# Patient Record
Sex: FEMALE | Race: WHITE | NOT HISPANIC OR LATINO | Employment: OTHER | URBAN - METROPOLITAN AREA
[De-identification: names, ages, dates, MRNs, and addresses within clinical notes are randomized per-mention and may not be internally consistent; named-entity substitution may affect disease eponyms.]

---

## 2023-08-07 LAB
ALANINE AMINOTRANSFERASE (SGPT) (U/L) IN SER/PLAS: 16 U/L (ref 7–45)
ALBUMIN (G/DL) IN SER/PLAS: 4.3 G/DL (ref 3.4–5)
ALBUMIN (MG/L) IN URINE: 17.4 MG/L
ALBUMIN/CREATININE (UG/MG) IN URINE: 13.8 UG/MG CRT (ref 0–30)
ALKALINE PHOSPHATASE (U/L) IN SER/PLAS: 64 U/L (ref 33–136)
ANION GAP IN SER/PLAS: 13 MMOL/L (ref 10–20)
ASPARTATE AMINOTRANSFERASE (SGOT) (U/L) IN SER/PLAS: 20 U/L (ref 9–39)
BASOPHILS (10*3/UL) IN BLOOD BY AUTOMATED COUNT: 0.06 X10E9/L (ref 0–0.1)
BASOPHILS/100 LEUKOCYTES IN BLOOD BY AUTOMATED COUNT: 1 % (ref 0–2)
BILIRUBIN TOTAL (MG/DL) IN SER/PLAS: 0.5 MG/DL (ref 0–1.2)
CALCIUM (MG/DL) IN SER/PLAS: 10.1 MG/DL (ref 8.6–10.3)
CARBON DIOXIDE, TOTAL (MMOL/L) IN SER/PLAS: 29 MMOL/L (ref 21–32)
CHLORIDE (MMOL/L) IN SER/PLAS: 99 MMOL/L (ref 98–107)
CHOLESTEROL (MG/DL) IN SER/PLAS: 249 MG/DL (ref 0–199)
CHOLESTEROL IN HDL (MG/DL) IN SER/PLAS: 58.4 MG/DL
CHOLESTEROL/HDL RATIO: 4.3
CREATININE (MG/DL) IN SER/PLAS: 0.7 MG/DL (ref 0.5–1.05)
CREATININE (MG/DL) IN URINE: 126 MG/DL (ref 20–320)
EOSINOPHILS (10*3/UL) IN BLOOD BY AUTOMATED COUNT: 0.18 X10E9/L (ref 0–0.4)
EOSINOPHILS/100 LEUKOCYTES IN BLOOD BY AUTOMATED COUNT: 3 % (ref 0–6)
ERYTHROCYTE DISTRIBUTION WIDTH (RATIO) BY AUTOMATED COUNT: 13.2 % (ref 11.5–14.5)
ERYTHROCYTE MEAN CORPUSCULAR HEMOGLOBIN CONCENTRATION (G/DL) BY AUTOMATED: 33.3 G/DL (ref 32–36)
ERYTHROCYTE MEAN CORPUSCULAR VOLUME (FL) BY AUTOMATED COUNT: 94 FL (ref 80–100)
ERYTHROCYTES (10*6/UL) IN BLOOD BY AUTOMATED COUNT: 4.24 X10E12/L (ref 4–5.2)
ESTIMATED AVERAGE GLUCOSE FOR HBA1C: 128 MG/DL
GFR FEMALE: >90 ML/MIN/1.73M2
GLUCOSE (MG/DL) IN SER/PLAS: 130 MG/DL (ref 74–99)
HEMATOCRIT (%) IN BLOOD BY AUTOMATED COUNT: 40 % (ref 36–46)
HEMOGLOBIN (G/DL) IN BLOOD: 13.3 G/DL (ref 12–16)
HEMOGLOBIN A1C/HEMOGLOBIN TOTAL IN BLOOD: 6.1 %
HEPATITIS C VIRUS AB PRESENCE IN SERUM: NONREACTIVE
IMMATURE GRANULOCYTES/100 LEUKOCYTES IN BLOOD BY AUTOMATED COUNT: 0.3 % (ref 0–0.9)
LDL: 167 MG/DL (ref 0–99)
LEUKOCYTES (10*3/UL) IN BLOOD BY AUTOMATED COUNT: 6.1 X10E9/L (ref 4.4–11.3)
LYMPHOCYTES (10*3/UL) IN BLOOD BY AUTOMATED COUNT: 1.62 X10E9/L (ref 0.8–3)
LYMPHOCYTES/100 LEUKOCYTES IN BLOOD BY AUTOMATED COUNT: 26.7 % (ref 13–44)
MONOCYTES (10*3/UL) IN BLOOD BY AUTOMATED COUNT: 0.44 X10E9/L (ref 0.05–0.8)
MONOCYTES/100 LEUKOCYTES IN BLOOD BY AUTOMATED COUNT: 7.2 % (ref 2–10)
NEUTROPHILS (10*3/UL) IN BLOOD BY AUTOMATED COUNT: 3.75 X10E9/L (ref 1.6–5.5)
NEUTROPHILS/100 LEUKOCYTES IN BLOOD BY AUTOMATED COUNT: 61.8 % (ref 40–80)
NRBC (PER 100 WBCS) BY AUTOMATED COUNT: 0 /100 WBC (ref 0–0)
PLATELETS (10*3/UL) IN BLOOD AUTOMATED COUNT: 185 X10E9/L (ref 150–450)
POTASSIUM (MMOL/L) IN SER/PLAS: 4.5 MMOL/L (ref 3.5–5.3)
PROTEIN TOTAL: 6.7 G/DL (ref 6.4–8.2)
SODIUM (MMOL/L) IN SER/PLAS: 136 MMOL/L (ref 136–145)
TRIGLYCERIDE (MG/DL) IN SER/PLAS: 118 MG/DL (ref 0–149)
UREA NITROGEN (MG/DL) IN SER/PLAS: 15 MG/DL (ref 6–23)
VLDL: 24 MG/DL (ref 0–40)

## 2023-11-06 ENCOUNTER — ANCILLARY PROCEDURE (OUTPATIENT)
Dept: RADIOLOGY | Facility: CLINIC | Age: 73
End: 2023-11-06
Payer: MEDICARE

## 2023-11-06 DIAGNOSIS — Z12.31 ENCOUNTER FOR SCREENING MAMMOGRAM FOR MALIGNANT NEOPLASM OF BREAST: ICD-10-CM

## 2023-11-06 PROCEDURE — 77067 SCR MAMMO BI INCL CAD: CPT | Mod: BILATERAL PROCEDURE | Performed by: RADIOLOGY

## 2023-11-06 PROCEDURE — 77067 SCR MAMMO BI INCL CAD: CPT

## 2023-11-06 PROCEDURE — 77063 BREAST TOMOSYNTHESIS BI: CPT | Mod: BILATERAL PROCEDURE | Performed by: RADIOLOGY

## 2023-11-10 DIAGNOSIS — G47.33 OSA (OBSTRUCTIVE SLEEP APNEA): Primary | ICD-10-CM

## 2023-11-10 DIAGNOSIS — G47.33 OBSTRUCTIVE SLEEP APNEA (ADULT) (PEDIATRIC): ICD-10-CM

## 2023-11-10 NOTE — PROGRESS NOTES
Patient called in requested PAP supply order, last seen Anais Lentz 6/9/23, order placed to [facility].

## 2023-11-28 ENCOUNTER — PATIENT OUTREACH (OUTPATIENT)
Dept: CARE COORDINATION | Facility: CLINIC | Age: 73
End: 2023-11-28
Payer: MEDICARE

## 2023-11-28 NOTE — PROGRESS NOTES
Left message on patient's phone with these details.    Miguel Angel, My name is Yun. I am a Patient Navigator with [facility].    I am reaching out to assist you in scheduling your next Annual Wellness Visit with your Primary Care Provider. If you would like assistance with scheduling your next Wellness Visit you can call my direct number at [phone] or the main office of your Primary Care Provider.

## 2024-02-06 ENCOUNTER — OFFICE VISIT (OUTPATIENT)
Dept: PODIATRY | Facility: CLINIC | Age: 74
End: 2024-02-06
Payer: MEDICARE

## 2024-02-06 DIAGNOSIS — M72.2 PLANTAR FASCIITIS: ICD-10-CM

## 2024-02-06 DIAGNOSIS — L82.0 SEBORRHEIC KERATOSES, INFLAMED: Primary | ICD-10-CM

## 2024-02-06 DIAGNOSIS — L29.9 ITCHING: ICD-10-CM

## 2024-02-06 PROCEDURE — 1159F MED LIST DOCD IN RCRD: CPT | Performed by: PODIATRIST

## 2024-02-06 PROCEDURE — 1160F RVW MEDS BY RX/DR IN RCRD: CPT | Performed by: PODIATRIST

## 2024-02-06 PROCEDURE — 3008F BODY MASS INDEX DOCD: CPT | Performed by: PODIATRIST

## 2024-02-06 PROCEDURE — 1126F AMNT PAIN NOTED NONE PRSNT: CPT | Performed by: PODIATRIST

## 2024-02-06 PROCEDURE — 99213 OFFICE O/P EST LOW 20 MIN: CPT | Performed by: PODIATRIST

## 2024-02-06 PROCEDURE — 17110 DESTRUCTION B9 LES UP TO 14: CPT | Performed by: PODIATRIST

## 2024-02-06 RX ORDER — NITROGLYCERIN 0.4 MG/1
0.4 TABLET SUBLINGUAL EVERY 5 MIN PRN
COMMUNITY
Start: 2019-08-12

## 2024-02-06 RX ORDER — LISINOPRIL 10 MG/1
10 TABLET ORAL DAILY
COMMUNITY
End: 2024-05-07

## 2024-02-06 NOTE — PROGRESS NOTES
History Of Present Illness  Melisa Oseguera is a 73 y.o. female presenting with chief complaint of: Painful lesion underneath her left fifth metatarsal head.  She also has some aching in her feet resembling plantar fasciitis.     Past Medical History  She has no past medical history on file.    Surgical History  She has a past surgical history that includes Tonsillectomy (03/21/2016); Other surgical history (12/16/2021); and Colonoscopy (11/21/2022).     Social History  She has no history on file for tobacco use, alcohol use, and drug use.    Family History  Family History   Problem Relation Name Age of Onset    Breast cancer Mother's Sister      Breast cancer Father's Sister          Allergies  Patient has no known allergies.    Medications  Current Outpatient Medications   Medication Sig Dispense Refill    lisinopril 10 mg tablet Take 1 tablet (10 mg) by mouth once daily.      nitroglycerin (Nitrostat) 0.4 mg SL tablet Place 1 tablet (0.4 mg) under the tongue every 5 minutes if needed.       No current facility-administered medications for this visit.       Review of Systems    REVIEW OF SYSTEMS  GENERAL:  Negative for malaise, significant weight loss, fever  CARDIOVASCULAR: leg swelling   MUSCULOSKELETAL:  Negative for joint pain or swelling, back pain, and muscle pain.  SKIN:  Negative for lesions, rash, and itching  PSYCH:  Negative for sleep disturbance, mood disorder and recent psychosocial stressors  NEURO: Negative, denies any burning, tingling or numbness     Objective:   Vasc: DP and PT pulses are palpable bilateral.  CFT is less than 3 seconds bilateral.  Skin temperature is warm to cool proximal to distal bilateral.      Neuro:  Light touch is intact to the foot bilateral.  Protective sensation is intact to the foot when tested with the 5.07 SWM bilateral.  There is no clonus noted.  The hallux is downgoing bilateral.      Derm: Nails are normal. Skin is supple with normal texture and turgor noted.   Webspaces are clean, dry and intact bilateral.  Subfifth metatarsal head left foot: Patient has lesion (s) on plantar feet that are punctate with nucleated deep painful core    Ortho: Muscle strength is 5/5 for all pedal groups tested.  Ankle joint, subtalar joint, 1st MPJ and lesser MPJ ROM is full and without pain or crepitus.  Patient has a taut plantar fascia more pronounced on the left than the right.  She has some arch fatigue.  Assessment/Plan     Diagnoses and all orders for this visit:  Seborrheic keratoses, inflamed  Itching  Plantar fasciitis      Lesion(s) are excised with scalpel blade.  Cryo treatment is performed for 30 seconds x 3     Patient can try to file lesion down if it reoccurs.  As for her plantar fasciitis,  Patient can try ice, stretching exercises  And shoe gear modification.

## 2024-03-26 ENCOUNTER — APPOINTMENT (OUTPATIENT)
Dept: PRIMARY CARE | Facility: CLINIC | Age: 74
End: 2024-03-26
Payer: MEDICARE

## 2024-04-03 PROBLEM — M54.50 LOWER BACK PAIN: Status: ACTIVE | Noted: 2024-04-03

## 2024-04-03 PROBLEM — H91.90 HEARING LOSS: Status: ACTIVE | Noted: 2024-04-03

## 2024-04-03 PROBLEM — R73.9 HYPERGLYCEMIA: Status: ACTIVE | Noted: 2024-04-03

## 2024-04-03 PROBLEM — M65.28 CALCIFIC ACHILLES TENDINITIS OF RIGHT LOWER EXTREMITY: Status: ACTIVE | Noted: 2024-04-03

## 2024-04-03 PROBLEM — G89.29 TOE PAIN, CHRONIC: Status: ACTIVE | Noted: 2024-04-03

## 2024-04-03 PROBLEM — R51.9 OCCIPITAL HEADACHE: Status: ACTIVE | Noted: 2024-04-03

## 2024-04-03 PROBLEM — M79.674 CHRONIC PAIN OF TOE OF RIGHT FOOT: Status: ACTIVE | Noted: 2024-04-03

## 2024-04-03 PROBLEM — J34.2 DEVIATED NASAL SEPTUM: Status: ACTIVE | Noted: 2024-04-03

## 2024-04-03 PROBLEM — I10 HYPERTENSION: Status: ACTIVE | Noted: 2024-04-03

## 2024-04-03 PROBLEM — L60.0 INGROWN TOENAIL OF RIGHT FOOT: Status: ACTIVE | Noted: 2024-04-03

## 2024-04-03 PROBLEM — R94.31 ABNORMAL ECG: Status: ACTIVE | Noted: 2024-04-03

## 2024-04-03 PROBLEM — I10 BENIGN ESSENTIAL HYPERTENSION: Status: ACTIVE | Noted: 2024-04-03

## 2024-04-03 PROBLEM — R94.39 ABNORMAL STRESS TEST: Status: ACTIVE | Noted: 2024-04-03

## 2024-04-03 PROBLEM — M79.672 PAIN OF LEFT HEEL: Status: ACTIVE | Noted: 2024-04-03

## 2024-04-03 PROBLEM — R03.0 ELEVATED BLOOD PRESSURE READING WITHOUT DIAGNOSIS OF HYPERTENSION: Status: ACTIVE | Noted: 2024-04-03

## 2024-04-03 PROBLEM — L29.9 PRURITUS: Status: ACTIVE | Noted: 2024-04-03

## 2024-04-03 PROBLEM — R20.2 TINGLING: Status: ACTIVE | Noted: 2024-04-03

## 2024-04-03 PROBLEM — R07.9 CHEST PAIN: Status: ACTIVE | Noted: 2024-04-03

## 2024-04-03 PROBLEM — I25.84 CORONARY ARTERY CALCIFICATION: Status: ACTIVE | Noted: 2024-04-03

## 2024-04-03 PROBLEM — E78.00 HYPERCHOLESTEROLEMIA: Status: ACTIVE | Noted: 2024-04-03

## 2024-04-03 PROBLEM — M76.61 CALCIFIC ACHILLES TENDINITIS OF RIGHT LOWER EXTREMITY: Status: ACTIVE | Noted: 2024-04-03

## 2024-04-03 PROBLEM — M76.62 ACHILLES TENDINITIS OF LEFT LOWER EXTREMITY: Status: ACTIVE | Noted: 2024-04-03

## 2024-04-03 PROBLEM — E78.5 HYPERLIPIDEMIA: Status: ACTIVE | Noted: 2024-04-03

## 2024-04-03 PROBLEM — L82.0 SEBORRHEIC KERATOSIS, INFLAMED: Status: ACTIVE | Noted: 2024-04-03

## 2024-04-03 PROBLEM — J30.9 ALLERGIC RHINITIS: Status: ACTIVE | Noted: 2024-04-03

## 2024-04-03 PROBLEM — M65.311 TRIGGER FINGER OF RIGHT THUMB: Status: ACTIVE | Noted: 2024-04-03

## 2024-04-03 PROBLEM — S81.011A LACERATION OF KNEE, RIGHT: Status: ACTIVE | Noted: 2024-04-03

## 2024-04-03 PROBLEM — M19.90 ARTHRITIS: Status: ACTIVE | Noted: 2024-04-03

## 2024-04-03 PROBLEM — M79.644 PAIN OF RIGHT THUMB: Status: ACTIVE | Noted: 2024-04-03

## 2024-04-03 PROBLEM — R06.83 SNORING: Status: ACTIVE | Noted: 2024-04-03

## 2024-04-03 PROBLEM — E11.9 CONTROLLED DIABETES MELLITUS (MULTI): Status: ACTIVE | Noted: 2024-04-03

## 2024-04-03 PROBLEM — D12.6 TUBULAR ADENOMA OF COLON: Status: ACTIVE | Noted: 2024-04-03

## 2024-04-03 PROBLEM — G89.29 CHRONIC PAIN OF TOE OF RIGHT FOOT: Status: ACTIVE | Noted: 2024-04-03

## 2024-04-03 PROBLEM — B35.1 NAIL FUNGUS: Status: ACTIVE | Noted: 2024-04-03

## 2024-04-03 PROBLEM — M79.676 TOE PAIN, CHRONIC: Status: ACTIVE | Noted: 2024-04-03

## 2024-04-03 PROBLEM — Z78.0 MENOPAUSE: Status: ACTIVE | Noted: 2024-04-03

## 2024-04-03 PROBLEM — I25.10 CORONARY ARTERY CALCIFICATION: Status: ACTIVE | Noted: 2024-04-03

## 2024-04-03 PROBLEM — R06.02 SOBOE (SHORTNESS OF BREATH ON EXERTION): Status: ACTIVE | Noted: 2024-04-03

## 2024-04-18 PROBLEM — M72.2 PLANTAR FASCIITIS: Status: ACTIVE | Noted: 2024-04-18

## 2024-04-18 PROBLEM — Z71.89 CARDIAC RISK COUNSELING: Status: ACTIVE | Noted: 2024-04-18

## 2024-04-18 PROBLEM — Z00.00 HEALTHCARE MAINTENANCE: Status: ACTIVE | Noted: 2024-04-18

## 2024-04-23 ENCOUNTER — APPOINTMENT (OUTPATIENT)
Dept: PRIMARY CARE | Facility: CLINIC | Age: 74
End: 2024-04-23
Payer: MEDICARE

## 2024-05-06 DIAGNOSIS — I10 ESSENTIAL (PRIMARY) HYPERTENSION: ICD-10-CM

## 2024-05-07 RX ORDER — LISINOPRIL 10 MG/1
10 TABLET ORAL DAILY
Qty: 90 TABLET | Refills: 0 | Status: SHIPPED | OUTPATIENT
Start: 2024-05-07

## 2024-08-01 PROBLEM — I10 BENIGN ESSENTIAL HYPERTENSION: Status: RESOLVED | Noted: 2024-04-03 | Resolved: 2024-08-01

## 2024-08-01 PROBLEM — Z71.89 CARDIAC RISK COUNSELING: Status: RESOLVED | Noted: 2024-04-18 | Resolved: 2024-08-01

## 2024-08-01 PROBLEM — R03.0 ELEVATED BLOOD PRESSURE READING WITHOUT DIAGNOSIS OF HYPERTENSION: Status: RESOLVED | Noted: 2024-04-03 | Resolved: 2024-08-01

## 2024-08-01 PROBLEM — E78.00 HYPERCHOLESTEROLEMIA: Status: RESOLVED | Noted: 2024-04-03 | Resolved: 2024-08-01

## 2024-08-05 DIAGNOSIS — I10 ESSENTIAL (PRIMARY) HYPERTENSION: ICD-10-CM

## 2024-08-06 ENCOUNTER — APPOINTMENT (OUTPATIENT)
Dept: PRIMARY CARE | Facility: CLINIC | Age: 74
End: 2024-08-06
Payer: MEDICARE

## 2024-08-06 VITALS
TEMPERATURE: 98.1 F | HEIGHT: 64 IN | WEIGHT: 166 LBS | BODY MASS INDEX: 28.34 KG/M2 | SYSTOLIC BLOOD PRESSURE: 136 MMHG | DIASTOLIC BLOOD PRESSURE: 83 MMHG | HEART RATE: 68 BPM

## 2024-08-06 DIAGNOSIS — H91.90 HEARING LOSS, UNSPECIFIED HEARING LOSS TYPE, UNSPECIFIED LATERALITY: ICD-10-CM

## 2024-08-06 DIAGNOSIS — Z00.00 HEALTHCARE MAINTENANCE: Primary | ICD-10-CM

## 2024-08-06 DIAGNOSIS — Z78.0 ASYMPTOMATIC MENOPAUSE: ICD-10-CM

## 2024-08-06 DIAGNOSIS — I10 PRIMARY HYPERTENSION: ICD-10-CM

## 2024-08-06 DIAGNOSIS — Z00.00 ROUTINE GENERAL MEDICAL EXAMINATION AT HEALTH CARE FACILITY: ICD-10-CM

## 2024-08-06 DIAGNOSIS — G47.33 OSA (OBSTRUCTIVE SLEEP APNEA): ICD-10-CM

## 2024-08-06 DIAGNOSIS — I83.90 VARICOSE VEINS OF LOWER EXTREMITY, UNSPECIFIED LATERALITY, UNSPECIFIED WHETHER COMPLICATED: ICD-10-CM

## 2024-08-06 DIAGNOSIS — E66.3 OVERWEIGHT WITH BODY MASS INDEX (BMI) OF 28 TO 28.9 IN ADULT: ICD-10-CM

## 2024-08-06 DIAGNOSIS — E78.2 MIXED HYPERLIPIDEMIA: ICD-10-CM

## 2024-08-06 DIAGNOSIS — E11.9 CONTROLLED TYPE 2 DIABETES MELLITUS WITHOUT COMPLICATION, WITHOUT LONG-TERM CURRENT USE OF INSULIN (MULTI): ICD-10-CM

## 2024-08-06 DIAGNOSIS — L98.9 SKIN LESION OF BACK: ICD-10-CM

## 2024-08-06 LAB — POC HEMOGLOBIN A1C: 6.2 % (ref 4.2–6.5)

## 2024-08-06 PROCEDURE — 3008F BODY MASS INDEX DOCD: CPT | Performed by: FAMILY MEDICINE

## 2024-08-06 PROCEDURE — 99214 OFFICE O/P EST MOD 30 MIN: CPT | Performed by: FAMILY MEDICINE

## 2024-08-06 PROCEDURE — 1170F FXNL STATUS ASSESSED: CPT | Performed by: FAMILY MEDICINE

## 2024-08-06 PROCEDURE — 1159F MED LIST DOCD IN RCRD: CPT | Performed by: FAMILY MEDICINE

## 2024-08-06 PROCEDURE — 83036 HEMOGLOBIN GLYCOSYLATED A1C: CPT | Performed by: FAMILY MEDICINE

## 2024-08-06 PROCEDURE — 3075F SYST BP GE 130 - 139MM HG: CPT | Performed by: FAMILY MEDICINE

## 2024-08-06 PROCEDURE — 1123F ACP DISCUSS/DSCN MKR DOCD: CPT | Performed by: FAMILY MEDICINE

## 2024-08-06 PROCEDURE — G0439 PPPS, SUBSEQ VISIT: HCPCS | Performed by: FAMILY MEDICINE

## 2024-08-06 PROCEDURE — 4010F ACE/ARB THERAPY RXD/TAKEN: CPT | Performed by: FAMILY MEDICINE

## 2024-08-06 PROCEDURE — 1160F RVW MEDS BY RX/DR IN RCRD: CPT | Performed by: FAMILY MEDICINE

## 2024-08-06 PROCEDURE — 3079F DIAST BP 80-89 MM HG: CPT | Performed by: FAMILY MEDICINE

## 2024-08-06 ASSESSMENT — ACTIVITIES OF DAILY LIVING (ADL)
MANAGING_FINANCES: INDEPENDENT
TAKING_MEDICATION: INDEPENDENT
GROCERY_SHOPPING: INDEPENDENT
BATHING: INDEPENDENT
DOING_HOUSEWORK: INDEPENDENT
DRESSING: INDEPENDENT

## 2024-08-06 ASSESSMENT — PATIENT HEALTH QUESTIONNAIRE - PHQ9
1. LITTLE INTEREST OR PLEASURE IN DOING THINGS: NOT AT ALL
2. FEELING DOWN, DEPRESSED OR HOPELESS: NOT AT ALL
SUM OF ALL RESPONSES TO PHQ9 QUESTIONS 1 AND 2: 0

## 2024-08-06 NOTE — PROGRESS NOTES
"Subjective   Reason for Visit: Melisa Oseguera is an 74 y.o. female here for a Medicare Wellness visit.     Past Medical, Surgical, and Family History reviewed and updated in chart.     Patient presents today for an annual wellness visit and follow-up on her chronic medical problems.  She has some spots on her back that she would like to have looked at.  She also has some varicose veins she has some concerns with.  She states they do not really cause pain.  She continues to eat healthy and exercise.  She reports that mood is good.  She denies any chest pain or shortness of breath or abdominal pain.  She denies any other concerns.    HPI    Patient Care Team:  Parisa Mcnamara MD as PCP - General  Parisa Mcnamara MD as PCP - [facility] Attributed Provider     Review of Systems    Objective   Vitals:  /83 (BP Location: Left arm, Patient Position: Sitting)   Pulse 68   Temp 36.7 °C (98.1 °F)   Ht 1.626 m (5' 4\")   Wt 75.3 kg (166 lb)   BMI 28.49 kg/m²       Physical Exam  HENT:      Head: Normocephalic.      Right Ear: Tympanic membrane normal.      Left Ear: Tympanic membrane normal.      Mouth/Throat:      Mouth: Mucous membranes are moist.      Pharynx: Oropharynx is clear.   Eyes:      Extraocular Movements: Extraocular movements intact.      Conjunctiva/sclera: Conjunctivae normal.      Pupils: Pupils are equal, round, and reactive to light.   Cardiovascular:      Rate and Rhythm: Normal rate and regular rhythm.      Pulses: Normal pulses.   Pulmonary:      Effort: Pulmonary effort is normal.      Breath sounds: Normal breath sounds.   Chest:   Breasts:     Right: Normal. No mass, nipple discharge, skin change or tenderness.      Left: Normal. No mass, nipple discharge, skin change or tenderness.   Abdominal:      General: There is no distension.      Palpations: Abdomen is soft.      Tenderness: There is no abdominal tenderness.   Musculoskeletal:         General: Normal range of motion.      Cervical " back: Neck supple.   Lymphadenopathy:      Cervical: No cervical adenopathy.   Skin:     General: Skin is warm and dry.      Comments: Patient with the 2 lesions on the back on the right mid back there is about a 1 cm scaly erythematous patch.  On the left side of the back there is a raised soft lesion with some hyperpigmentation around it.   Neurological:      General: No focal deficit present.      Mental Status: She is alert.         Assessment/Plan   Problem List Items Addressed This Visit             ICD-10-CM    MILTON (obstructive sleep apnea) G47.33     Continue with CPAP and continue follow-up with sleep medicine.         Controlled diabetes mellitus (Multi) E11.9     Diabetes has been diet controlled.  Continue with diabetic diet and exercise.  See ophthalmology.  Check feet.  Follow-up in 6 months to reevaluate.         Relevant Orders    POCT glycosylated hemoglobin (Hb A1C) manually resulted (Completed)    Hearing loss H91.90     Patient does report hearing loss.  Will be referred to audiology for further evaluation.         Relevant Orders    Referral to Audiology    Hyperlipidemia E78.5     Cholesterol is elevated.  ASCVD risk score is 34%.  Recommend starting a statin which patient declines.         Hypertension I10     Blood pressure controlled.  Continue with current dose of lisinopril.         Relevant Orders    Albumin-Creatinine Ratio, Urine Random    Lipid Panel    Comprehensive Metabolic Panel    CBC and Auto Differential    Healthcare maintenance - Primary Z00.00     Continue with healthy diet and exercise.  Screening blood work ordered.  Mammogram up-to-date.  Bone density ordered.  Colon cancer screening up-to-date.  Recommend Shingrix vaccine and RSV vaccine.         Overweight with body mass index (BMI) of 28 to 28.9 in adult E66.3, Z68.28    Varicose veins of lower extremity I83.90     Reviewed wearing compression stockings and elevating legs.  Patient declines seeing vein specialist at  this point.         Skin lesion of back L98.9     2 lesions on the back.  Discussed with patient this could be precancerous.  Recommend she see dermatology for further evaluation.          Other Visit Diagnoses         Codes    Asymptomatic menopause     Z78.0    Relevant Orders    XR DEXA bone density    Routine general medical examination at health care facility     Z00.00

## 2024-08-07 NOTE — ASSESSMENT & PLAN NOTE
2 lesions on the back.  Discussed with patient this could be precancerous.  Recommend she see dermatology for further evaluation.

## 2024-08-07 NOTE — ASSESSMENT & PLAN NOTE
Continue with healthy diet and exercise.  Screening blood work ordered.  Mammogram up-to-date.  Bone density ordered.  Colon cancer screening up-to-date.  Recommend Shingrix vaccine and RSV vaccine.

## 2024-08-07 NOTE — ASSESSMENT & PLAN NOTE
Cholesterol is elevated.  ASCVD risk score is 34%.  Recommend starting a statin which patient declines.

## 2024-08-07 NOTE — ASSESSMENT & PLAN NOTE
Reviewed wearing compression stockings and elevating legs.  Patient declines seeing vein specialist at this point.

## 2024-08-07 NOTE — ASSESSMENT & PLAN NOTE
Diabetes has been diet controlled.  Continue with diabetic diet and exercise.  See ophthalmology.  Check feet.  Follow-up in 6 months to reevaluate.

## 2024-08-08 RX ORDER — LISINOPRIL 10 MG/1
10 TABLET ORAL DAILY
Qty: 90 TABLET | Refills: 1 | Status: SHIPPED | OUTPATIENT
Start: 2024-08-08

## 2024-09-21 ENCOUNTER — TELEMEDICINE (OUTPATIENT)
Dept: PRIMARY CARE | Facility: CLINIC | Age: 74
End: 2024-09-21
Payer: MEDICARE

## 2024-09-21 DIAGNOSIS — U07.1 COVID: Primary | ICD-10-CM

## 2024-09-21 PROCEDURE — 4010F ACE/ARB THERAPY RXD/TAKEN: CPT | Performed by: FAMILY MEDICINE

## 2024-09-21 PROCEDURE — 99214 OFFICE O/P EST MOD 30 MIN: CPT | Performed by: FAMILY MEDICINE

## 2024-09-21 PROCEDURE — 1159F MED LIST DOCD IN RCRD: CPT | Performed by: FAMILY MEDICINE

## 2024-09-21 PROCEDURE — 1160F RVW MEDS BY RX/DR IN RCRD: CPT | Performed by: FAMILY MEDICINE

## 2024-09-21 PROCEDURE — 1123F ACP DISCUSS/DSCN MKR DOCD: CPT | Performed by: FAMILY MEDICINE

## 2024-09-21 NOTE — PROGRESS NOTES
I performed this visit using realtime telehealth tools, including an audio/video OR telephone connection between the patient listed who was located in the [facility] and myself, Karen Richter (Board certified in the [facility]).  At the start of the visit, I introduced myself as Dr. De Jesus and verified the patients name, , and current physical location.    If they were currently outside of the state of [place], the visit was ended and the patient was referred to alternative means for evaluation and treatment.   The patient was made aware of the limitations of the telehealth visit.  They will not be physically examined and all issues may not be appropriate for a telehealth visit.  If necessary, an in person referral will be made.      DISCLAIMER:   In preparing for this visit and writing this note, I reviewed previous electronic medical records (labs, imaging and medical charts) of the patient available in the physician portal. Significant findings which helped in decision making are recorded in this encounter charting.    All allergies were reviewed with the patient and all medications reconciled with the patient.     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 Yes  Discussed stopping statin x 7 days --N/A  Kidney function reviewed --normal 1 year ago-- no history of kidney issues  Telemedicine limits the ability to do a complete and accurate physical exam.    Discussed current isolation guidelines per the CDC  Vaccine booster recommended in the next ~2 months if not already received this season    At the completion of the visit, possible diagnoses and plan was discussed with the patient as well as recommended treatments, medications prescribed, and when to follow up for in person evaluation. All questions were answered and the patient verbalized understanding.

## 2024-09-21 NOTE — PATIENT INSTRUCTIONS
COVID +  Paxlovid discussed--how to take and how it works and adverse effects--paxlovid prescribed? Yes  Discussed stopping statin x 7 days --N/A  Kidney function reviewed --normal 1 year ago-- no history of kidney issues  Telemedicine limits the ability to do a complete and accurate physical exam.   Discussed current isolation guidelines per the CDC  Vaccine booster recommended in the next ~2 months if not already received this season    Please send me a [employer] message if you have any questions or concerns.  FOR NON URGENT questions only.  Allow up to 72 hours for response.    If you have prescription issues or other questions you can email   Shoaib Young  Digital Health Coordinator, at   [email or web address]     Rest and drink plenty of fluids    Tylenol and or motrin as needed for pain and fever (unless you have been told not to take these because of your personal medical history)    Discussed options and precautions (complaint specific and may include)  Viral versus bacterial infection; use of medications; possible side effects; appropriate over-the-counter medications; possible complications and /or when to follow-up.    Follow-up in 1 to 2 days if not improving.  Follow-up immediately if symptoms worsen.    All red flags requiring in person care were discussed.  All patient's questions were answered.    To connect with a new PCP please visit [email or web address] or call [phone]     If experiencing any severe or worsening symptoms including but not limited to lethargy / chest pain / weakness / dizziness / difficulty breathing please call 911 or go to the emergency department for immediate care!    Limitations to telemedicine include inability to do a complete and accurate physical exam.  Any concerns regarding this were conveyed with the patient and in person follow-up recommended if patient nature of illness does not progress as anticipated during this  visit.    CDC updated Respiratory Infection guidelines:   When you have a respiratory virus, stay home and away from others (including people  you live with who are not sick) Symptoms can include fever, chills, fatigue, cough,  runny nose, and headache (and others).    You may return to work when the following 3 conditions are met:    1) No fever without the use of a fever reducer for 24 hours  2 symptoms are overall improving AND  3) symptoms are mild     When you go back to your normal activities, take added precaution over the next 5 days, such as taking additional steps for  air, hygiene, masks, physical distancing, and/or testing when you will be around other people indoors.    Keep in mind that you may still be able to spread the virus that made you sick, even if you are feeling better. You are likely to be less contagious at this time, depending on factors like how long you were sick or how sick you were.    If you develop a fever or you start to feel worse after you have gone back to normal activities, stay home and away from others again until, for at least 24 hours, both are true: your symptoms are improving overall, and you have not had a fever (and are not using fever-reducing medication). Then take added precaution for the next 5 days.    If you never had symptoms but tested positive for a respiratory virus?, you may be contagious. For the next 5 days: take added precaution, such as taking additional steps for  air, hygiene, masks, physical distancing, and/or testing when you will be around other people indoors. This is especially important to protect people with factors that increase their risk of severe illness from respiratory viruses.  Avoid immunocompromised, elderly, pregnant women, infants etc    Have a low threshold for in person evaluation if your symptoms worsen.      Over-the-counter cold and cough medications    Take Mucinex for cough, drink plenty of fluids with this  medication and will help break up congestion making it easier to cough up    For cough can use honey (children ages 1 and up) in hot tea or hot water. I recommend putting this in an insulated cup and carrying it around throughout the day to sip on.  Have it at your bedside at night in case you wake up coughing.  You can also use honey cough drops (adults and older children).    Recommend nasal saline for use in children and adults.  Neti Davis can also be helpful.  (Never used tap water and a Neti pot.  Use distilled water.)    If you have plugged up congested ears or the feeling of fluid in your ears, you can use an over-the-counter nasal steroid spray like fluticasone (brand name Flonase) use 2 sprays into each nostril once or twice a day for 7 days.  This will help open up the eustachian tubes and allow the fluid to drain out of your ears.    Sleeping with your head/chest elevated can help with sinus drainage.    Adults only-can use nasal decongestant (like Afrin) at bedtime to open nasal passages so you can breathe through your nose while you sleep; avoid using for longer than 3 days as this medication can become addicting.  Do not use if you have high blood pressure or high heart rate.    For severe pain or fever in adult-Tylenol (2 extra strength) or ibuprofen (3-4 tabs equals 600 to 800 mg) alternating as needed for pain.  Tylenol doses should be 6 to 8 hours apart and ibuprofen doses should be 6 to 8 hours apart.      Common cold medications for adults explained:    **Cold medications for children are not recommended-only Tylenol, Motrin, honey (only for children older than 1 year), cool-mist humidifier, and nasal saline spray are recommended for children.    Mucinex-(generic name guaifenesin)-is an expectorant.  This will thin out all the thick mucus.  Must drink plenty of liquids for this medicine to work.    Dextromethorphan (brand name Delsym or DM)-this medicine is a cough suppressant--caution/avoid use  if taking SSRI medication because of risk of Serotonin Syndrome    Honey in hot water or tea-this is a natural cough suppressant    Decongestant nasal spray- (eg: Afrin) use for temporary relief of nasal congestion-best when used at bedtime to open up nasal passages so that you are not forced to mouth breathe overnight.    Sudafed (generic name pseudoephedrine-this must be bought from the pharmacist) DO NOT use this medicine if you have high blood pressure as it can raise your blood pressure higher.  Do not use if you have any irregular heart rate.  This medicine can help clear congestion in your sinuses.         I will STOP taking the medications listed below when I get home from the hospital:  None

## 2024-10-31 NOTE — PROGRESS NOTES
Patient: Melisa Oseguera    [id number]  : 1950 -- AGE 74 y.o.    Provider: VENUS Xie     Location [facility]   Service Date: 2024            [facility]  Followup Visit Note      HISTORY OF PRESENT ILLNESS     HISTORY OF PRESENT ILLNESS   Melisa Oseguera is a 74 y.o. female with h/o Hypertension, MILTON, Diabetes, and CAD, HLD, former smoker   who presents to a [facility] for followup.     Current History    24: Here for annual MILTON follow up. Doing well and using every night with positive benefit.   Mask: MASK TYPE: DREAMWEAR MEDIUM NASAL PILLOWS MASK  Issues with therapy: ISSUES WITH THERAPY: dry mouth in morning  ;   Benefits with PAP: PERCEIVED BENEFITS OF PAP: refreshing sleep reduced daytime sleepiness decreased or no snoring better sleep quality  Needs annual supply order placed.     last seen Anais Lentz 23        Media Information        ESS: 6      REVIEW OF SYSTEMS     REVIEW OF SYSTEMS  Review of Systems  A 13 point review of systems was performed and was unremarkable except as per HPI.     ALLERGIES AND MEDICATIONS     ALLERGIES  No Known Allergies    MEDICATIONS: She has a current medication list which includes the following prescription(s): lisinopril - TAKE 1 TABLET DAILY.    PAST MEDICAL HISTORY : She  has no past medical history on file.    PAST SURGICAL HISTORY: She  has a past surgical history that includes Tonsillectomy (2016); Other surgical history (2021); and Colonoscopy (2022).     FAMILY HISTORY: No changes since previous visit. Otherwise non-contributory as charted.     SOCIAL HISTORY  She  reports that she quit smoking about 46 years ago. Her smoking use included cigarettes. She has never used smokeless tobacco. She reports current alcohol use. She reports that she does not use drugs.       PHYSICAL EXAM     VITAL SIGNS: /77   Pulse 70   Resp 18   Ht  "1.626 m (5' 4\")   Wt 74.5 kg (164 lb 4.8 oz)   BMI 28.20 kg/m²      PREVIOUS WEIGHTS:  Wt Readings from Last 3 Encounters:   11/07/24 74.5 kg (164 lb 4.8 oz)   08/06/24 75.3 kg (166 lb)   06/09/23 74.8 kg (165 lb)     Constitutional: Alert and oriented, cooperative, no obvious distress   HEENT: Non icteric or anemic, EOM WNL bilaterally   Neck: Supple, no JVD, no goiter, no adenopathy, no rigidity      RESULTS/DATA     Bicarbonate (mmol/L)   Date Value   08/07/2023 29   10/21/2021 29   01/24/2020 31         ASSESSMENT/PLAN     Ms. Oseguera is a 74 y.o. female and she returns in followup to the [facility] for MILTON.    Problem List, Orders, Assessment, Recommendations:    #MILTON, mild  SS: 3% AHI: 12.6, 4% AHI: 4.9, SpO2 gallito: 86%, %sats< 88%- < than 1 minute  - Retrieved and personally reviewed recent PAP adherence download data today. See HPI.  - excellent compliance to PAP therapy, residual AHI at goal, and good control of MILTON symptoms  - continue current setting 4-12 CWP  - renew PAP supply orders, order placed to [facility]  - diet, exercise, and weight loss were emphasized today in clinic, as were non-supine sleep, avoiding alcohol in the late evening, and driving or operating heavy machinery when sleepy. Patient verbalized understanding.     #HTN  BP Readings from Last 1 Encounters:   11/07/24 124/77     - doing well, asymptomatic, denies any headache, blurry vision, chest pain, palpitation, dizziness, lightheadedness, or syncopal episodes  - discussed at length the impact of untreated MILTON and BP control  - supportive management: low salt DASH diet (less than 2000 mg sodium intake daily), moderate intensity aerobic exercise at least 30 minutes 5 days per week, reduce stress, quit smoking, limit alcohol, lose weight, and monitor BP once daily  - continue current management and follow-up with PCP     #Overweight  BMI Readings from Last 1 Encounters:   11/07/24 28.20 kg/m²     - " Encouraged healthy weight loss via diet and exercise  - Weight loss can help in the long term treatment of MILTON.  - Defer management to PCP     Disposition    Return to clinic in 12 months    I personally spent 30 minutes today (exclusive of procedures) providing care for this patient, including preparation, face to face time, EMR documentation and other services such as review of medical records, diagnostic results, patient education, counseling, and coordination of care.

## 2024-11-07 ENCOUNTER — APPOINTMENT (OUTPATIENT)
Facility: CLINIC | Age: 74
End: 2024-11-07
Payer: MEDICARE

## 2024-11-07 VITALS
DIASTOLIC BLOOD PRESSURE: 77 MMHG | HEART RATE: 70 BPM | HEIGHT: 64 IN | WEIGHT: 164.3 LBS | BODY MASS INDEX: 28.05 KG/M2 | RESPIRATION RATE: 18 BRPM | SYSTOLIC BLOOD PRESSURE: 124 MMHG

## 2024-11-07 DIAGNOSIS — I10 PRIMARY HYPERTENSION: ICD-10-CM

## 2024-11-07 DIAGNOSIS — G47.33 OSA (OBSTRUCTIVE SLEEP APNEA): Primary | ICD-10-CM

## 2024-11-07 DIAGNOSIS — E66.3 OVERWEIGHT (BMI 25.0-29.9): ICD-10-CM

## 2024-11-07 PROCEDURE — 1159F MED LIST DOCD IN RCRD: CPT | Performed by: NURSE PRACTITIONER

## 2024-11-07 PROCEDURE — 3008F BODY MASS INDEX DOCD: CPT | Performed by: NURSE PRACTITIONER

## 2024-11-07 PROCEDURE — 1123F ACP DISCUSS/DSCN MKR DOCD: CPT | Performed by: NURSE PRACTITIONER

## 2024-11-07 PROCEDURE — 1036F TOBACCO NON-USER: CPT | Performed by: NURSE PRACTITIONER

## 2024-11-07 PROCEDURE — 4010F ACE/ARB THERAPY RXD/TAKEN: CPT | Performed by: NURSE PRACTITIONER

## 2024-11-07 PROCEDURE — 3078F DIAST BP <80 MM HG: CPT | Performed by: NURSE PRACTITIONER

## 2024-11-07 PROCEDURE — 3074F SYST BP LT 130 MM HG: CPT | Performed by: NURSE PRACTITIONER

## 2024-11-07 PROCEDURE — 99214 OFFICE O/P EST MOD 30 MIN: CPT | Performed by: NURSE PRACTITIONER

## 2024-11-07 PROCEDURE — 1160F RVW MEDS BY RX/DR IN RCRD: CPT | Performed by: NURSE PRACTITIONER

## 2024-11-07 PROCEDURE — G2211 COMPLEX E/M VISIT ADD ON: HCPCS | Performed by: NURSE PRACTITIONER

## 2024-11-07 ASSESSMENT — SLEEP AND FATIGUE QUESTIONNAIRES
HOW LIKELY ARE YOU TO NOD OFF OR FALL ASLEEP WHILE SITTING AND READING: SLIGHT CHANCE OF DOZING
HOW LIKELY ARE YOU TO NOD OFF OR FALL ASLEEP WHILE SITTING QUIETLY AFTER LUNCH WITHOUT ALCOHOL: WOULD NEVER DOZE
ESS-CHAD TOTAL SCORE: 6
HOW LIKELY ARE YOU TO NOD OFF OR FALL ASLEEP WHILE LYING DOWN TO REST IN THE AFTERNOON WHEN CIRCUMSTANCES PERMIT: MODERATE CHANCE OF DOZING
SITING INACTIVE IN A PUBLIC PLACE LIKE A CLASS ROOM OR A MOVIE THEATER: WOULD NEVER DOZE
HOW LIKELY ARE YOU TO NOD OFF OR FALL ASLEEP WHILE WATCHING TV: MODERATE CHANCE OF DOZING
HOW LIKELY ARE YOU TO NOD OFF OR FALL ASLEEP IN A CAR, WHILE STOPPED FOR A FEW MINUTES IN TRAFFIC: SLIGHT CHANCE OF DOZING
HOW LIKELY ARE YOU TO NOD OFF OR FALL ASLEEP WHILE SITTING AND TALKING TO SOMEONE: WOULD NEVER DOZE
HOW LIKELY ARE YOU TO NOD OFF OR FALL ASLEEP WHEN YOU ARE A PASSENGER IN A CAR FOR AN HOUR WITHOUT A BREAK: WOULD NEVER DOZE

## 2024-11-07 ASSESSMENT — PATIENT HEALTH QUESTIONNAIRE - PHQ9
2. FEELING DOWN, DEPRESSED OR HOPELESS: NOT AT ALL
1. LITTLE INTEREST OR PLEASURE IN DOING THINGS: NOT AT ALL
SUM OF ALL RESPONSES TO PHQ9 QUESTIONS 1 AND 2: 0

## 2024-11-07 ASSESSMENT — COLUMBIA-SUICIDE SEVERITY RATING SCALE - C-SSRS
1. IN THE PAST MONTH, HAVE YOU WISHED YOU WERE DEAD OR WISHED YOU COULD GO TO SLEEP AND NOT WAKE UP?: NO
6. HAVE YOU EVER DONE ANYTHING, STARTED TO DO ANYTHING, OR PREPARED TO DO ANYTHING TO END YOUR LIFE?: NO
2. HAVE YOU ACTUALLY HAD ANY THOUGHTS OF KILLING YOURSELF?: NO

## 2024-11-07 ASSESSMENT — ENCOUNTER SYMPTOMS
LOSS OF SENSATION IN FEET: 0
DEPRESSION: 0
OCCASIONAL FEELINGS OF UNSTEADINESS: 0

## 2024-11-07 NOTE — PATIENT INSTRUCTIONS
[facility] Sleep Medicine   [facility]  [address] [address]  [phone]       NAME: Melisa Oseguera   DATE: 11/07/24    Your Sleep Provider Today: VENUS Xie  Your Primary Care Physician: Parisa Mcnamara MD       DIAGNOSIS:   1. MILTON (obstructive sleep apnea)            Thank you for coming to the Sleep Medicine Clinic today! Your sleep medicine provider today was: VENUS Xie Below is a summary of your treatment plan, other important information, and our contact numbers:      TREATMENT PLAN     - Follow-up in 12 months.  - If not already done, sign up for 'My Chart' and send prescription requests or messages through this    Annual Reminders About Your Sleep Apnea    Your sleep apnea is well controlled based on reviewing your PAP Data Report. A supply renewal prescription has been sent to your DME company.     General Reminders:  Continue current machine settings. Continue using machine every night. Need at least 4 hours daily usage.   Remember to clean your mask, tubings, and water chamber regularly as instructed.  Know the replacement schedule of your supplies/ accessories and contact your DME (durable medical equipment) provider if you are due for them.  Avoid driving or operating heavy machinery when drowsy. A person driving while sleepy is 5 times more likely to have an accident. If you feel sleepy, pull over and take a short power nap (sleep for less than 30 minutes). Otherwise, ask somebody to drive you.    Follow[facility]up sooner through [facility] or calling our office if you have any of the following symptoms:  Snoring or stopping breathing while using the machine  Recurrence of fatigue, sleepiness, insomnia, and other symptoms you had prior using machine  Persistent or worsening fatigue or sleepiness despite regular use of machine  Issues tolerating the machine like bloating sensation, air hunger, too much hot air, too much  pressure, taking off mask without recall in the middle of sleep, etc.     Other conservative measures to improve sleep apnea:  Losing weight can lead to some improvement of MILTON which means you will need lower pressures in machine to control your MILTON. In some patients, they don't need to use the machine after bariatric surgery. Hence, consider medical and/or surgical weight loss especially if your BMI is more than 35.  Avoiding alcohol, sedative-hypnotics, and sedating medications is imperative as these substances can worsen snoring and sleep apnea  If you have nasal congestion or seasonal allergies, improving your breathing through the nose is critical for treating sleep apnea, tolerating CPAP, and improving your sleep; hence, using intranasal steroid spray like Flonase might help. Make sure you know the proper way to use it.  Stay off your back when sleeping.    Common issues with PAP machine:  Mask gets dislodged when turning to the side: Consider getting a CPAP pillow or switching to a mask with hose on top.     Dry mouth:  Your machine has built-in humidifier that heats up the air to prevent dry mouth. It can be adjusted to your comfort. You can try that first and increase setting one level one night at a time to check which setting is comfortable and effective in lessening dry mouth. If dry mouth persists despite humidity setting adjustment, may apply OTC Biotene gel over the gums at bedtime.  If Biotene gel is not effective, consider trying XEROSTOM gel from [employer].  Also, eliminate or reduce dose of meds that can cause dry mouth if possible. Lastly, may try getting a separate room humidifier machine.    Airleaks: Please call DME as they may need to adjust your mask or refit you with a different kind of mask. In addition, you can ask DME for tips on getting a good mask seal and mask fit.     Difficulty tolerating the mask: Contact your DME to try a different kind of mask and/or call office to get a  "referral to Sleep Psychologist for CPAP desensitization. CPAP desensitization technique is a set of strategies that helps patient cope with claustrophobia and anxiety related to wearing mask. Alternatively, we can do a daytime mini-sleep study called PAP-nap trial wherein you will try on different kinds of mask and the sleep technician will try different pressure settings on CPAP and BPAP machines to see which specific pressure is tolerable and comfortable for you.     Water droplets or moisture within the hose and/or mask: This is called rain-out and it is caused by condensation of too much heated humidity on the cooler walls of the hose. If you have rain-out, turn down humidity settings or get a heated hose. If you already have a heated hose, turn up the \"tube temperature\" of the heated hose. Alternatively, if you don't want to get a heated hose or warmer air, may wrap the CPAP hose with stockings to keep it somewhat warm. Also, you need to place the machine on the floor and lower the hose so that water won't travel upward towards your mask.       Maintaining your CPAP/BPAP device:    The humidification chamber (aka water tank or water chamber) needs to be filled with distilled water to prevent buildup of white deposits in the future. If you cannot find distilled water, you can use tap water but expect to have white deposits buildup seen after prolonged use with tap water. If you start seeing white deposits on the water chamber, you can clean it by filling it with equal parts of distilled white vinegar and water. Let the vinegar-water mixture sit for 2 hours, and then rinse it with running tap water. Clean with soap and water then let it dry.     You should try to keep your machine clean in order to work well. Here are some tips to clean PAP supplies / accessories:    Clean the humidification chamber (aka water tank) as well as your mask and tubing at least once a week with soap and water.   Alternatively, you can " fill a sink or basin with warm water and add a little mild detergent, like Ivory dish soap. Gently wipe your supplies with the soapy water to free all the oils and dirt that may have collected. Once that's done, rinse these items with clean water until the soap is gone and let them air dry. You can hang your tubing over the curtain zack in your bathroom so that it dries.  The mask insert (part of the mask that has contact with your skin) needs to be cleaned with soap and water daily. Another option is to wipe them down with CPAP wipes or baby wipes.    You should replace your mask and tubing frequently in order to prevent bacteria buildup, machine damage, and mask seal issues. The older the mask and hose, the high likelihood that there is bacteria buildup in it especially if they are not cleaned regularly. Dirty filters damage machines because build-up of dust and contaminants can cause machine to over-heat, and in time, damage the motor of machine. Cushions lose their seal over time as most masks are made of plastic and silicone while headgear is made of neoprene. These materials will break down with age and frequent use. Here is the recommended replacement schedule for PAP supplies / accessories:    Twice a month- disposable filters and cushions for nasal mask or nasal pillows.  Once a month- cushion for full face mask  Every 3 months- mask with headgear and PAP tubing (standard or heated hose)  Every 6 months- reusable filter, water chamber, and chin strap     Other useful information:    Some people do not put water in the tank while other people prefers to put water in the tank to prevent mouth dryness. Try to experiment to determine which is more comfortable for you.   In general, new machines have 2 years warranty on parts while health insurance allows you to have a new machine once every 5 years.      IMPORTANT INFORMATION     Call 911 for medical emergencies.  Our offices are generally open from  "Monday-Friday, 9 am - 5 pm.  If you need to get in touch with me, you may either call me/my team (number is below) or you can use [employer].  If a referral for a test, for CPAP, or for another specialist was made, and you have not heard about scheduling this within a week, please call scheduling at [phone] ([phone]).  If you are unable to make your appointment for clinic or an overnight study, kindly call the office at least 48 hours in advance to cancel and reschedule.  If you are on CPAP, please bring your device's card or the device to each clinic appointment.   There are no supporting services by either the sleep doctors or their staff on weekends and Holidays, or after 5 PM on weekdays.     PRESCRIPTIONS     We require 7 days advanced notice for prescription refills. If we do not receive the request in this time, we cannot guarantee that your medication will be refilled in time.      IMPORTANT PHONE NUMBERS     [facility]: [phone]  [employer] ([email or web address]): [phone]  [employer] ([email or web address]): [phone]  [facility] (DME): [phone]    CONTACTING YOUR SLEEP MEDICINE PROVIDER AND SLEEP TEAM      For issues with your machine or mask interface, please call your DME provider first. [email or web address] stands for durable medical company. [employer] is the company who provides you the machine and/or PAP supplies / accessories.   To schedule, cancel, or reschedule SLEEP STUDY APPOINTMENTS, please call the Main Phone Line at [phone] - option 3.   To schedule, cancel, or reschedule CLINIC APPOINTMENTS, you can do it in \"MyChart\", call [phone] for [facility] office , [phone] for [facility] office to speak with my on site staff, or call the Main Phone Line at [phone] - option 2  For CLINICAL QUESTIONS or MEDICATION REFILLS, please call direct line for Adult Sleep Nurses at [phone].   Lastly, you can also send a message directly to your provider through \"My Chart\", " which is the email service through your  Records Account: [email or web address]     Adult Sleep Nurses (Aimee Hubbard, RN and Jacqueline Moreira, RN):  For clinical questions and refilling prescriptions: [phone]  Email sleep diaries and other documents at: [email or web address]    Office locations for Shu Driscoll NP:    [facility]   [address]   [address]  [place], [place] [address] [phone]    [address]  [address]  [place], [place] [address] [phone]    [address]  [address]  [place], [place] [address] [phone]    OUR SLEEP TESTING LOCATIONS     Our team will contact you to schedule your sleep study, however, you can contact us as follow:  Main Phone Line (scheduling only): [phone]), option 3    Sleep Testing Locations:   [place] (18 years and older): [address] [place] [address], 2nd floor   [place] (18 years and older): [address]; 4th floor  After hours line: [phone]  [facility] (18 years and older) at [place]: [id number] [place] [address]  After hours line: [phone]   [facility] (Main campus: All ages): [facility], 6th floor. After hours line: [phone]   [place] (5 years and older; younger considered on case-by-case basis): [address] [place] [address]; [employer] [facility] [address], [address]. Scheduling  After hours line: [phone]       Here at [facility], we wish you a restful sleep!    Your sleep medicine provider for this visit was: Shu Driscoll, APRN-CNP

## 2024-12-27 ENCOUNTER — OFFICE VISIT (OUTPATIENT)
Dept: URGENT CARE | Age: 74
End: 2024-12-27
Payer: MEDICARE

## 2024-12-27 VITALS
SYSTOLIC BLOOD PRESSURE: 136 MMHG | TEMPERATURE: 97.9 F | HEIGHT: 64 IN | RESPIRATION RATE: 16 BRPM | HEART RATE: 80 BPM | DIASTOLIC BLOOD PRESSURE: 70 MMHG | OXYGEN SATURATION: 98 % | WEIGHT: 150 LBS | BODY MASS INDEX: 25.61 KG/M2

## 2024-12-27 DIAGNOSIS — R05.1 ACUTE COUGH: Primary | ICD-10-CM

## 2024-12-27 PROCEDURE — 3008F BODY MASS INDEX DOCD: CPT | Performed by: FAMILY MEDICINE

## 2024-12-27 PROCEDURE — 1159F MED LIST DOCD IN RCRD: CPT | Performed by: FAMILY MEDICINE

## 2024-12-27 PROCEDURE — 3078F DIAST BP <80 MM HG: CPT | Performed by: FAMILY MEDICINE

## 2024-12-27 PROCEDURE — 3075F SYST BP GE 130 - 139MM HG: CPT | Performed by: FAMILY MEDICINE

## 2024-12-27 PROCEDURE — 99203 OFFICE O/P NEW LOW 30 MIN: CPT | Performed by: FAMILY MEDICINE

## 2024-12-27 PROCEDURE — 1036F TOBACCO NON-USER: CPT | Performed by: FAMILY MEDICINE

## 2024-12-27 PROCEDURE — 4010F ACE/ARB THERAPY RXD/TAKEN: CPT | Performed by: FAMILY MEDICINE

## 2024-12-27 PROCEDURE — 1123F ACP DISCUSS/DSCN MKR DOCD: CPT | Performed by: FAMILY MEDICINE

## 2024-12-27 PROCEDURE — 1160F RVW MEDS BY RX/DR IN RCRD: CPT | Performed by: FAMILY MEDICINE

## 2024-12-27 RX ORDER — ALBUTEROL SULFATE 90 UG/1
2 INHALANT RESPIRATORY (INHALATION) EVERY 6 HOURS PRN
Qty: 1 G | Refills: 0 | Status: SHIPPED | OUTPATIENT
Start: 2024-12-27

## 2024-12-27 RX ORDER — BENZONATATE 200 MG/1
200 CAPSULE ORAL 3 TIMES DAILY PRN
Qty: 30 CAPSULE | Refills: 0 | Status: SHIPPED | OUTPATIENT
Start: 2024-12-27 | End: 2025-01-06

## 2024-12-27 ASSESSMENT — ENCOUNTER SYMPTOMS
COUGH: 1
SINUS PAIN: 0
CHILLS: 0
SORE THROAT: 0
EYE PAIN: 0
CHEST TIGHTNESS: 0
EYE REDNESS: 0
WHEEZING: 1
SHORTNESS OF BREATH: 0
FEVER: 0
EYE DISCHARGE: 0

## 2024-12-27 NOTE — PROGRESS NOTES
"Subjective   Patient ID: Melisa Oseguera is a 74 y.o. female. They present today with a chief complaint of Cough (X 1 week, getting worse. Now getting some wheezing in lungs).    History of Present Illness    History provided by:  Patient   used: No    Cough  This is a new problem. The current episode started in the past 7 days (12/21/24). The problem has been gradually worsening. The problem occurs hourly. The cough is Non-productive. Associated symptoms include wheezing. Pertinent negatives include no chest pain, chills, ear pain, eye redness, fever, sore throat or shortness of breath. She has tried OTC cough suppressant for the symptoms. The treatment provided mild relief.       Past Medical History  Allergies as of 12/27/2024    (No Known Allergies)       (Not in a hospital admission)       History reviewed. No pertinent past medical history.    Past Surgical History:   Procedure Laterality Date    COLONOSCOPY  11/21/2022    Colonoscopy (Fiberoptic)    OTHER SURGICAL HISTORY  12/16/2021    [place] tooth extraction    TONSILLECTOMY  03/21/2016    Tonsillectomy        reports that she quit smoking about 47 years ago. Her smoking use included cigarettes. She has never used smokeless tobacco. She reports current alcohol use. She reports that she does not use drugs.    Review of Systems  Review of Systems   Constitutional:  Negative for chills and fever.   HENT:  Negative for ear pain, sinus pain and sore throat.    Eyes:  Negative for pain, discharge and redness.   Respiratory:  Positive for cough and wheezing. Negative for chest tightness and shortness of breath.    Cardiovascular:  Negative for chest pain.                                  Objective    Vitals:    12/27/24 1403   BP: 136/70   BP Location: Left arm   Patient Position: Sitting   BP Cuff Size: Adult   Pulse: 80   Resp: 16   Temp: 36.6 °C (97.9 °F)   TempSrc: Oral   SpO2: 98%   Weight: 68 kg (150 lb)   Height: 1.626 m (5' 4\")     No " LMP recorded. Patient is postmenopausal.    Physical Exam  Vitals reviewed.   Constitutional:       General: She is not in acute distress.     Appearance: She is not ill-appearing.   HENT:      Right Ear: Tympanic membrane and ear canal normal.      Left Ear: Tympanic membrane and ear canal normal.      Nose: Nose normal.      Mouth/Throat:      Mouth: Mucous membranes are moist.      Pharynx: No posterior oropharyngeal erythema.   Eyes:      Extraocular Movements: Extraocular movements intact.      Conjunctiva/sclera: Conjunctivae normal.      Pupils: Pupils are equal, round, and reactive to light.   Cardiovascular:      Rate and Rhythm: Normal rate and regular rhythm.      Heart sounds: No murmur heard.     No friction rub.   Pulmonary:      Effort: Pulmonary effort is normal. No respiratory distress.      Breath sounds: No wheezing, rhonchi or rales.   Lymphadenopathy:      Cervical: No cervical adenopathy.   Neurological:      Mental Status: She is alert.         Procedures    Point of Care Test & Imaging Results from this visit  No results found for this visit on 12/27/24.   No results found.    Diagnostic study results (if any) were reviewed by Tyrell Lentz DO.    Assessment/Plan   Allergies, medications, history, and pertinent labs/EKGs/Imaging reviewed by Tyrell Lentz DO.     Orders and Diagnoses  There are no diagnoses linked to this encounter.    Medical Admin Record      Patient disposition: Home    Electronically signed by Tyrell Lentz DO  2:19 PM

## 2025-01-24 ENCOUNTER — LAB (OUTPATIENT)
Dept: LAB | Facility: LAB | Age: 75
End: 2025-01-24
Payer: MEDICARE

## 2025-01-24 ENCOUNTER — HOSPITAL ENCOUNTER (OUTPATIENT)
Dept: RADIOLOGY | Facility: CLINIC | Age: 75
Discharge: HOME | End: 2025-01-24
Payer: MEDICARE

## 2025-01-24 DIAGNOSIS — R73.9 HYPERGLYCEMIA: ICD-10-CM

## 2025-01-24 DIAGNOSIS — I10 PRIMARY HYPERTENSION: ICD-10-CM

## 2025-01-24 DIAGNOSIS — Z78.0 ASYMPTOMATIC MENOPAUSE: ICD-10-CM

## 2025-01-24 DIAGNOSIS — R73.9 HYPERGLYCEMIA: Primary | ICD-10-CM

## 2025-01-24 LAB
ALBUMIN SERPL BCP-MCNC: 4.6 G/DL (ref 3.4–5)
ALP SERPL-CCNC: 82 U/L (ref 33–136)
ALT SERPL W P-5'-P-CCNC: 23 U/L (ref 7–45)
ANION GAP SERPL CALC-SCNC: 13 MMOL/L (ref 10–20)
AST SERPL W P-5'-P-CCNC: 16 U/L (ref 9–39)
BASOPHILS # BLD AUTO: 0.06 X10*3/UL (ref 0–0.1)
BASOPHILS NFR BLD AUTO: 1.2 %
BILIRUB SERPL-MCNC: 0.7 MG/DL (ref 0–1.2)
BUN SERPL-MCNC: 14 MG/DL (ref 6–23)
CALCIUM SERPL-MCNC: 10.5 MG/DL (ref 8.6–10.6)
CHLORIDE SERPL-SCNC: 97 MMOL/L (ref 98–107)
CHOLEST SERPL-MCNC: 299 MG/DL (ref 0–199)
CHOLESTEROL/HDL RATIO: 4.1
CO2 SERPL-SCNC: 30 MMOL/L (ref 21–32)
CREAT SERPL-MCNC: 0.63 MG/DL (ref 0.5–1.05)
EGFRCR SERPLBLD CKD-EPI 2021: >90 ML/MIN/1.73M*2
EOSINOPHIL # BLD AUTO: 0.19 X10*3/UL (ref 0–0.4)
EOSINOPHIL NFR BLD AUTO: 3.9 %
ERYTHROCYTE [DISTWIDTH] IN BLOOD BY AUTOMATED COUNT: 12.8 % (ref 11.5–14.5)
EST. AVERAGE GLUCOSE BLD GHB EST-MCNC: 137 MG/DL
GLUCOSE SERPL-MCNC: 148 MG/DL (ref 74–99)
HBA1C MFR BLD: 6.4 %
HCT VFR BLD AUTO: 41.6 % (ref 36–46)
HDLC SERPL-MCNC: 72.1 MG/DL
HGB BLD-MCNC: 13.9 G/DL (ref 12–16)
IMM GRANULOCYTES # BLD AUTO: 0.02 X10*3/UL (ref 0–0.5)
IMM GRANULOCYTES NFR BLD AUTO: 0.4 % (ref 0–0.9)
LDLC SERPL CALC-MCNC: 202 MG/DL
LYMPHOCYTES # BLD AUTO: 1.46 X10*3/UL (ref 0.8–3)
LYMPHOCYTES NFR BLD AUTO: 30.2 %
MCH RBC QN AUTO: 31.1 PG (ref 26–34)
MCHC RBC AUTO-ENTMCNC: 33.4 G/DL (ref 32–36)
MCV RBC AUTO: 93 FL (ref 80–100)
MONOCYTES # BLD AUTO: 0.4 X10*3/UL (ref 0.05–0.8)
MONOCYTES NFR BLD AUTO: 8.3 %
NEUTROPHILS # BLD AUTO: 2.71 X10*3/UL (ref 1.6–5.5)
NEUTROPHILS NFR BLD AUTO: 56 %
NON HDL CHOLESTEROL: 227 MG/DL (ref 0–149)
NRBC BLD-RTO: 0 /100 WBCS (ref 0–0)
PLATELET # BLD AUTO: 190 X10*3/UL (ref 150–450)
POTASSIUM SERPL-SCNC: 5 MMOL/L (ref 3.5–5.3)
PROT SERPL-MCNC: 6.8 G/DL (ref 6.4–8.2)
RBC # BLD AUTO: 4.47 X10*6/UL (ref 4–5.2)
SODIUM SERPL-SCNC: 135 MMOL/L (ref 136–145)
TRIGL SERPL-MCNC: 127 MG/DL (ref 0–149)
VLDL: 25 MG/DL (ref 0–40)
WBC # BLD AUTO: 4.8 X10*3/UL (ref 4.4–11.3)

## 2025-01-24 PROCEDURE — 77080 DXA BONE DENSITY AXIAL: CPT | Performed by: RADIOLOGY

## 2025-01-24 PROCEDURE — 80061 LIPID PANEL: CPT

## 2025-01-24 PROCEDURE — 80053 COMPREHEN METABOLIC PANEL: CPT

## 2025-01-24 PROCEDURE — 85025 COMPLETE CBC W/AUTO DIFF WBC: CPT

## 2025-01-24 PROCEDURE — 83036 HEMOGLOBIN GLYCOSYLATED A1C: CPT

## 2025-01-24 PROCEDURE — 77080 DXA BONE DENSITY AXIAL: CPT

## 2025-02-10 ENCOUNTER — APPOINTMENT (OUTPATIENT)
Dept: PRIMARY CARE | Facility: CLINIC | Age: 75
End: 2025-02-10
Payer: MEDICARE

## 2025-02-10 VITALS
HEART RATE: 72 BPM | BODY MASS INDEX: 28.32 KG/M2 | SYSTOLIC BLOOD PRESSURE: 133 MMHG | OXYGEN SATURATION: 99 % | WEIGHT: 165 LBS | DIASTOLIC BLOOD PRESSURE: 83 MMHG | TEMPERATURE: 97.1 F

## 2025-02-10 DIAGNOSIS — E11.9 CONTROLLED TYPE 2 DIABETES MELLITUS WITHOUT COMPLICATION, WITHOUT LONG-TERM CURRENT USE OF INSULIN (MULTI): Primary | ICD-10-CM

## 2025-02-10 DIAGNOSIS — I10 ESSENTIAL (PRIMARY) HYPERTENSION: ICD-10-CM

## 2025-02-10 DIAGNOSIS — E66.3 OVERWEIGHT WITH BODY MASS INDEX (BMI) OF 28 TO 28.9 IN ADULT: ICD-10-CM

## 2025-02-10 DIAGNOSIS — I10 PRIMARY HYPERTENSION: ICD-10-CM

## 2025-02-10 DIAGNOSIS — E78.2 MIXED HYPERLIPIDEMIA: ICD-10-CM

## 2025-02-10 DIAGNOSIS — M85.80 OSTEOPENIA, UNSPECIFIED LOCATION: ICD-10-CM

## 2025-02-10 DIAGNOSIS — Z12.31 SCREENING MAMMOGRAM FOR BREAST CANCER: ICD-10-CM

## 2025-02-10 PROCEDURE — 4010F ACE/ARB THERAPY RXD/TAKEN: CPT | Performed by: FAMILY MEDICINE

## 2025-02-10 PROCEDURE — 3079F DIAST BP 80-89 MM HG: CPT | Performed by: FAMILY MEDICINE

## 2025-02-10 PROCEDURE — 1036F TOBACCO NON-USER: CPT | Performed by: FAMILY MEDICINE

## 2025-02-10 PROCEDURE — 3044F HG A1C LEVEL LT 7.0%: CPT | Performed by: FAMILY MEDICINE

## 2025-02-10 PROCEDURE — 3075F SYST BP GE 130 - 139MM HG: CPT | Performed by: FAMILY MEDICINE

## 2025-02-10 PROCEDURE — 1160F RVW MEDS BY RX/DR IN RCRD: CPT | Performed by: FAMILY MEDICINE

## 2025-02-10 PROCEDURE — 1159F MED LIST DOCD IN RCRD: CPT | Performed by: FAMILY MEDICINE

## 2025-02-10 PROCEDURE — 1123F ACP DISCUSS/DSCN MKR DOCD: CPT | Performed by: FAMILY MEDICINE

## 2025-02-10 PROCEDURE — G2211 COMPLEX E/M VISIT ADD ON: HCPCS | Performed by: FAMILY MEDICINE

## 2025-02-10 PROCEDURE — 99214 OFFICE O/P EST MOD 30 MIN: CPT | Performed by: FAMILY MEDICINE

## 2025-02-10 PROCEDURE — 3050F LDL-C >= 130 MG/DL: CPT | Performed by: FAMILY MEDICINE

## 2025-02-10 RX ORDER — LISINOPRIL 10 MG/1
10 TABLET ORAL DAILY
Qty: 90 TABLET | Refills: 1 | Status: SHIPPED | OUTPATIENT
Start: 2025-02-10

## 2025-02-10 ASSESSMENT — PATIENT HEALTH QUESTIONNAIRE - PHQ9
1. LITTLE INTEREST OR PLEASURE IN DOING THINGS: NOT AT ALL
SUM OF ALL RESPONSES TO PHQ9 QUESTIONS 1 AND 2: 0
2. FEELING DOWN, DEPRESSED OR HOPELESS: NOT AT ALL

## 2025-02-10 NOTE — ASSESSMENT & PLAN NOTE
ASCVD risk score is 35%.  Recommend starting a statin which patient declines.  Will plan to recheck in 6 months.

## 2025-02-10 NOTE — ASSESSMENT & PLAN NOTE
Patient declines medication at this point.  Continue with calcium, vitamin D and weightbearing exercises.  Will check vitamin D level.  Repeat bone density in 2 years.

## 2025-02-10 NOTE — ASSESSMENT & PLAN NOTE
Diabetes is controlled with diet.  Continue with diabetic diet and exercise.  See ophthalmology.  Check feet.  Recheck blood work in 6 months.

## 2025-02-10 NOTE — ASSESSMENT & PLAN NOTE
Blood pressure well-controlled.  Continue with lisinopril.  Check blood work as ordered.  Follow-up in 6 months to reevaluate.

## 2025-02-10 NOTE — PROGRESS NOTES
Subjective   Patient ID: Melisa Oseguera is a 74 y.o. female who presents for Follow-up (No concerns).  Patient presents today for follow-up on her chronic medical problems.  She reports that overall she has been doing well.  She states that she did have upper respiratory illness and used albuterol and that was helpful.  She states that she continues to eat healthy and stay active.  She denies any chest pain or shortness of breath or abdominal pain.  We reviewed that cholesterol remains elevated.  She declines starting a statin.  We did review that her ASCVD risk score is over 20%.  We also reviewed her bone density showed moderate osteopenia with a T-score of -1.8.  She would like to continue with calcium and vitamin D and weightbearing exercises.  She denies any other concerns.  HPI  Social History     Socioeconomic History    Marital status:      Spouse name: Not on file    Number of children: Not on file    Years of education: Not on file    Highest education level: Not on file   Occupational History    Not on file   Tobacco Use    Smoking status: Former     Current packs/day: 0.00     Types: Cigarettes     Quit date:      Years since quittin.1    Smokeless tobacco: Never   Vaping Use    Vaping status: Never Used   Substance and Sexual Activity    Alcohol use: Yes     Comment: glass of wine daily    Drug use: Never    Sexual activity: Not on file   Other Topics Concern    Not on file   Social History Narrative    Not on file     Social Drivers of Health     Financial Resource Strain: Not on file   Food Insecurity: Not on file   Transportation Needs: Not on file   Physical Activity: Not on file   Stress: Not on file   Social Connections: Not on file   Intimate Partner Violence: Not on file   Housing Stability: Not on file     Current Outpatient Medications   Medication Sig Dispense Refill    albuterol 90 mcg/actuation inhaler Inhale 2 puffs every 6 hours if needed for wheezing. 1 g 0     lisinopril 10 mg tablet Take 1 tablet (10 mg) by mouth once daily. 90 tablet 1     No current facility-administered medications for this visit.     Family History   Problem Relation Name Age of Onset    Hypertension Mother      Sleep apnea Mother      Other (cva) Father      Sleep apnea Father      Hypertension Sister      Breast cancer Mother's Sister      Breast cancer Father's Sister       Review of Systems  Immunization History   Administered Date(s) Administered    COVID-19, mRNA, LNP-S, PF, 30 mcg/0.3 mL dose 03/02/2021, 03/24/2021, 09/24/2021    Flu vaccine, quadrivalent, high-dose, preservative free, age 65y+ (FLUZONE) 10/19/2021, 10/29/2022, 09/26/2023    Flu vaccine, trivalent, preservative free, HIGH-DOSE, age 65y+ (Fluzone) 10/19/2021, 11/22/2024    Influenza, Seasonal, Quadrivalent, Adjuvanted 10/26/2020, 11/08/2022    Influenza, Unspecified 12/10/2007, 10/01/2018    Pfizer COVID-19 vaccine, 12 years and older, (30mcg/0.3mL) (Comirnaty) 02/08/2024    Pfizer COVID-19 vaccine, bivalent, age 12 years and older (30 mcg/0.3 mL) 09/09/2022    Pfizer Gray Cap SARS-CoV-2 05/17/2022    Pneumococcal conjugate vaccine, 13-valent (PREVNAR 13) 08/12/2019    Pneumococcal polysaccharide vaccine, 23-valent, age 2 years and older (PNEUMOVAX 23) 01/11/2022    Tdap vaccine, age 7 year and older (BOOSTRIX, ADACEL) 06/12/2020, 04/17/2024    Zoster, live 02/11/2014       Review of Systems negative except as noted in HPI and Chief complaint.     Objective                 /83 (BP Location: Left arm, Patient Position: Sitting)   Pulse 72   Temp 36.2 °C (97.1 °F) (Skin)   Wt 74.8 kg (165 lb)   SpO2 99%   BMI 28.32 kg/m²    Physical Exam  Vitals reviewed.   HENT:      Head: Normocephalic and atraumatic.      Right Ear: Tympanic membrane normal.      Left Ear: Tympanic membrane normal.      Nose: Nose normal.      Mouth/Throat:      Pharynx: Oropharynx is clear.   Eyes:      Extraocular Movements: Extraocular movements  "intact.      Conjunctiva/sclera: Conjunctivae normal.      Pupils: Pupils are equal, round, and reactive to light.   Cardiovascular:      Rate and Rhythm: Normal rate and regular rhythm.      Pulses: Normal pulses.   Pulmonary:      Effort: Pulmonary effort is normal.      Breath sounds: Normal breath sounds.   Abdominal:      General: There is no distension.      Palpations: Abdomen is soft.      Tenderness: There is no abdominal tenderness.   Musculoskeletal:         General: Normal range of motion.      Cervical back: Normal range of motion and neck supple.      Right lower leg: No edema.      Left lower leg: No edema.   Lymphadenopathy:      Cervical: No cervical adenopathy.   Neurological:      General: No focal deficit present.      Mental Status: She is alert.       No results found for this or any previous visit (from the past 96 hours).  Lab Results   Component Value Date    WBC 4.8 01/24/2025    HGB 13.9 01/24/2025    HCT 41.6 01/24/2025    MCV 93 01/24/2025     01/24/2025     Lab Results   Component Value Date    GLUCOSE 148 (H) 01/24/2025    CALCIUM 10.5 01/24/2025     (L) 01/24/2025    K 5.0 01/24/2025    CO2 30 01/24/2025    CL 97 (L) 01/24/2025    BUN 14 01/24/2025    CREATININE 0.63 01/24/2025     Lab Results   Component Value Date    CHOL 299 (H) 01/24/2025    CHOL 249 (H) 08/07/2023    CHOL 272 (H) 10/21/2021     Lab Results   Component Value Date    HDL 72.1 01/24/2025    HDL 58.4 08/07/2023    HDL 64.0 10/21/2021     Lab Results   Component Value Date    LDLCALC 202 (H) 01/24/2025     Lab Results   Component Value Date    TRIG 127 01/24/2025    TRIG 118 08/07/2023    TRIG 135 10/21/2021     No components found for: \"CHOLHDL\"   No results found for: \"TSH\"   Lab Results   Component Value Date    HGBA1C 6.4 (H) 01/24/2025      No results found for: \"ALBUMINUR\"   Assessment/Plan   Problem List Items Addressed This Visit       Controlled diabetes mellitus (Multi) - Primary     Diabetes is " controlled with diet.  Continue with diabetic diet and exercise.  See ophthalmology.  Check feet.  Recheck blood work in 6 months.         Relevant Orders    Albumin-Creatinine Ratio, Urine Random    Hemoglobin A1C    Lipid Panel    Comprehensive Metabolic Panel    CBC and Auto Differential    Hyperlipidemia     ASCVD risk score is 35%.  Recommend starting a statin which patient declines.  Will plan to recheck in 6 months.         Hypertension     Blood pressure well-controlled.  Continue with lisinopril.  Check blood work as ordered.  Follow-up in 6 months to reevaluate.         Overweight with body mass index (BMI) of 28 to 28.9 in adult    Osteopenia     Patient declines medication at this point.  Continue with calcium, vitamin D and weightbearing exercises.  Will check vitamin D level.  Repeat bone density in 2 years.         Relevant Orders    Vitamin D 25-Hydroxy,Total (for eval of Vitamin D levels)     Other Visit Diagnoses       Screening mammogram for breast cancer        Relevant Orders    BI mammo bilateral screening tomosynthesis    Essential (primary) hypertension        Relevant Medications    lisinopril 10 mg tablet

## 2025-02-28 ENCOUNTER — LAB REQUISITION (OUTPATIENT)
Dept: DERMATOPATHOLOGY | Facility: CLINIC | Age: 75
End: 2025-02-28
Payer: MEDICARE

## 2025-02-28 DIAGNOSIS — D23.5 OTHER BENIGN NEOPLASM OF SKIN OF TRUNK: ICD-10-CM

## 2025-02-28 DIAGNOSIS — C43.59 MALIGNANT MELANOMA OF OTHER PART OF TRUNK: ICD-10-CM

## 2025-02-28 PROCEDURE — 88321 CONSLTJ&REPRT SLD PREP ELSWR: CPT | Performed by: DERMATOLOGY

## 2025-03-03 LAB
PATH REPORT.FINAL DX SPEC: NORMAL
PATH REPORT.GROSS SPEC: NORMAL
PATH REPORT.MICROSCOPIC SPEC OTHER STN: NORMAL
PATH REPORT.RELEVANT HX SPEC: NORMAL
PATH REPORT.TOTAL CANCER: NORMAL
PATHOLOGY SYNOPTIC REPORT: NORMAL

## 2025-03-03 NOTE — PROGRESS NOTES
Assessment and Plan:  Melisa Oseguera is a 74 y.o. female referred by Krista Ricketts with a newly diagnosed melanoma of the right upper back that is at least 0.8 mm thick, cT1b. After discussing the risks and benefits of wide local excision with sentinel lymph node biopsy the patient understands and would like to proceed. We will look for OR time at [place] in the coming weeks. I believe this will close primarily with a 1 cm margin.     Tumor board discussion is pending    I spent 60 minutes in the professional and overall care of this patient.    Tawanda Serrano MD  Assistant Professor of Surgery  Division of Surgical Oncology  [phone]  [email or web address]      History Of Present Illness  Referring provider: Krista Ricketts  Diagnosis: melanoma  Location: right upper back  Thickness: at least 0.8 mm  Margins: Mis at margins  Subtype: ss  High-risk features: ulcerated, 1 mitosis    All other systems have been reviewed and are negative except as noted in the HPI.    I personally reviewed all necessary laboratory results, pathology reports, and radiologic images for this patient.    Past Medical History  She has no past medical history on file.    Surgical History  She has a past surgical history that includes Tonsillectomy (03/21/2016); Other surgical history (12/16/2021); and Colonoscopy (11/21/2022).     Social History  She reports that she quit smoking about 47 years ago. Her smoking use included cigarettes. She has never used smokeless tobacco. She reports current alcohol use. She reports that she does not use drugs.    Family History  Family History   Problem Relation Name Age of Onset    Hypertension Mother      Sleep apnea Mother      Other (cva) Father      Sleep apnea Father      Hypertension Sister      Breast cancer Mother's Sister      Breast cancer Father's Sister          Allergies  Patient has no known allergies.     Last Recorded Vitals  There were no vitals taken for this  visit.    Physical Exam  General: no acute distress, well-nourished  Eyes: intact EOM, no scleral icterus  ENT: hearing intact, no drainage  Respiratory: symmetric chest rise, no cough  Cardiovascular: intact distal pulses, no pitting edema  Abdominal: Soft, nontender  Musculoskeletal: no deformities, intact strength  Integumentary: Healing biopsy site, no lymphadenopathy  Neuro: no focal deficits, sensation intact  Psych: normal mood and affect     Relevant Results  FINAL DIAGNOSIS   7 SLIDES, [facility], #[id number] (BX: 02/05/2025)     A. SKIN, RIGHT SUPERIOR UPPER BACK, SHAVE BIOPSY:  ULCERATED MALIGNANT MELANOMA, BRESLOW THICKNESS 0.8 MM, PRESENT ON THE DEEP AND PERIPHERAL MARGIN AND DERMAL NEVUS, PRESENT ON THE DEEP MARGIN, SEE NOTE.     Note: Microscopic examination reveals a specimen that extends into the dermis. There is an area of ulceration and there is an asymmetric proliferation of nested and single melanocytes throughout all layers of the epidermis. There are nests of benign-appearing melanocytes in the dermis and nests of atypical melanocytes. The atypical melanocytes in the epidermis and dermis have mildly enlarged nuclei with moderate cytoplasm. They stain with antibodies against Melan-A, SOX-10 and PRAME. The benign-appearing melanocytes in the dermis do not stain with antibodies against PRAME. A D2-40 stain was reviewed. On the H&E there is an area suspicious for vascular invasion.      B. SKIN, LEFT MID-UPPER BACK, SHAVE BIOPSY:  HEMANGIOMA, PRESENT ON THE DEEP MARGIN.     ** Electronically signed out by Yanni Burch MD **      Electronically signed by Yanni Burch MD on 3/3/2025 at 1011   Case Summary Report   MELANOMA OF THE SKIN: Biopsy   8th Edition - Protocol posted: 3/23/2022MELANOMA OF THE SKIN: BIOPSY - A  SPECIMEN   Procedure  Biopsy, shave   Specimen Laterality  Right   TUMOR   Tumor Site  Skin of trunk: Right superior upper back          Histologic  Type  Superficial spreading melanoma (low-cumulative sun damage (CSD) melanoma)   Maximum Tumor (Breslow) Thickness (Millimeters)  0.8 mm   Ulceration  Present   Extent of Ulceration (Millimeters)  1 mm   Anatomic (Britton) Level  IV (melanoma invades reticular dermis)   Mitotic Rate  1 mitoses per mm2   Microsatellite(s)  Not identified   Lymphovascular Invasion  Present   Neurotropism  Not identified   Tumor-Infiltrating Lymphocytes  Present, nonbrisk   Tumor Regression  Not identified   MARGINS     Margin Status for Invasive Melanoma  Invasive melanoma present at margin   Margin(s) Involved by Invasive Melanoma  Peripheral   Margin Status for Melanoma in situ  Melanoma in situ present at margin   Margin(s) Involved by Melanoma in Situ  Peripheral     Deep   PATHOLOGIC STAGE CLASSIFICATION (pTNM, AJCC 8th Edition)     pT Category  pT1b

## 2025-03-03 NOTE — H&P (VIEW-ONLY)
Assessment and Plan:  Melisa Osegeura is a 74 y.o. female referred by Krista Ricketts with a newly diagnosed melanoma of the right upper back that is at least 0.8 mm thick, cT1b. After discussing the risks and benefits of wide local excision with sentinel lymph node biopsy the patient understands and would like to proceed. We will look for OR time at [place] in the coming weeks. I believe this will close primarily with a 1 cm margin.     Tumor board discussion is pending    I spent 60 minutes in the professional and overall care of this patient.    Tawanda Serrano MD  Assistant Professor of Surgery  Division of Surgical Oncology  [phone]  [email or web address]      History Of Present Illness  Referring provider: Krista Ricketts  Diagnosis: melanoma  Location: right upper back  Thickness: at least 0.8 mm  Margins: Mis at margins  Subtype: ss  High-risk features: ulcerated, 1 mitosis    All other systems have been reviewed and are negative except as noted in the HPI.    I personally reviewed all necessary laboratory results, pathology reports, and radiologic images for this patient.    Past Medical History  She has no past medical history on file.    Surgical History  She has a past surgical history that includes Tonsillectomy (03/21/2016); Other surgical history (12/16/2021); and Colonoscopy (11/21/2022).     Social History  She reports that she quit smoking about 47 years ago. Her smoking use included cigarettes. She has never used smokeless tobacco. She reports current alcohol use. She reports that she does not use drugs.    Family History  Family History   Problem Relation Name Age of Onset    Hypertension Mother      Sleep apnea Mother      Other (cva) Father      Sleep apnea Father      Hypertension Sister      Breast cancer Mother's Sister      Breast cancer Father's Sister          Allergies  Patient has no known allergies.     Last Recorded Vitals  There were no vitals taken for this  visit.    Physical Exam  General: no acute distress, well-nourished  Eyes: intact EOM, no scleral icterus  ENT: hearing intact, no drainage  Respiratory: symmetric chest rise, no cough  Cardiovascular: intact distal pulses, no pitting edema  Abdominal: Soft, nontender  Musculoskeletal: no deformities, intact strength  Integumentary: Healing biopsy site, no lymphadenopathy  Neuro: no focal deficits, sensation intact  Psych: normal mood and affect     Relevant Results  FINAL DIAGNOSIS   7 SLIDES, [employer], #[id number] (BX: 02/05/2025)     A. SKIN, RIGHT SUPERIOR UPPER BACK, SHAVE BIOPSY:  ULCERATED MALIGNANT MELANOMA, BRESLOW THICKNESS 0.8 MM, PRESENT ON THE DEEP AND PERIPHERAL MARGIN AND DERMAL NEVUS, PRESENT ON THE DEEP MARGIN, SEE NOTE.     Note: Microscopic examination reveals a specimen that extends into the dermis. There is an area of ulceration and there is an asymmetric proliferation of nested and single melanocytes throughout all layers of the epidermis. There are nests of benign-appearing melanocytes in the dermis and nests of atypical melanocytes. The atypical melanocytes in the epidermis and dermis have mildly enlarged nuclei with moderate cytoplasm. They stain with antibodies against Melan-A, SOX-10 and PRAME. The benign-appearing melanocytes in the dermis do not stain with antibodies against PRAME. A D2-40 stain was reviewed. On the H&E there is an area suspicious for vascular invasion.      B. SKIN, LEFT MID-UPPER BACK, SHAVE BIOPSY:  HEMANGIOMA, PRESENT ON THE DEEP MARGIN.     ** Electronically signed out by Yanni Burch MD **      Electronically signed by Yanni Burch MD on 3/3/2025 at 1011   Case Summary Report   MELANOMA OF THE SKIN: Biopsy   8th Edition - Protocol posted: 3/23/2022MELANOMA OF THE SKIN: BIOPSY - A  SPECIMEN   Procedure  Biopsy, shave   Specimen Laterality  Right   TUMOR   Tumor Site  Skin of trunk: Right superior upper back          Histologic  Type  Superficial spreading melanoma (low-cumulative sun damage (CSD) melanoma)   Maximum Tumor (Breslow) Thickness (Millimeters)  0.8 mm   Ulceration  Present   Extent of Ulceration (Millimeters)  1 mm   Anatomic (Britton) Level  IV (melanoma invades reticular dermis)   Mitotic Rate  1 mitoses per mm2   Microsatellite(s)  Not identified   Lymphovascular Invasion  Present   Neurotropism  Not identified   Tumor-Infiltrating Lymphocytes  Present, nonbrisk   Tumor Regression  Not identified   MARGINS     Margin Status for Invasive Melanoma  Invasive melanoma present at margin   Margin(s) Involved by Invasive Melanoma  Peripheral   Margin Status for Melanoma in situ  Melanoma in situ present at margin   Margin(s) Involved by Melanoma in Situ  Peripheral     Deep   PATHOLOGIC STAGE CLASSIFICATION (pTNM, AJCC 8th Edition)     pT Category  pT1b

## 2025-03-04 ENCOUNTER — OFFICE VISIT (OUTPATIENT)
Dept: SURGICAL ONCOLOGY | Facility: CLINIC | Age: 75
End: 2025-03-04
Payer: MEDICARE

## 2025-03-04 VITALS
HEART RATE: 74 BPM | WEIGHT: 163.5 LBS | TEMPERATURE: 98.4 F | BODY MASS INDEX: 28.97 KG/M2 | HEIGHT: 63 IN | RESPIRATION RATE: 16 BRPM | OXYGEN SATURATION: 98 % | SYSTOLIC BLOOD PRESSURE: 151 MMHG | DIASTOLIC BLOOD PRESSURE: 74 MMHG

## 2025-03-04 DIAGNOSIS — C43.59 MALIGNANT MELANOMA OF UPPER BACK (MULTI): Primary | ICD-10-CM

## 2025-03-04 PROCEDURE — 1123F ACP DISCUSS/DSCN MKR DOCD: CPT | Performed by: STUDENT IN AN ORGANIZED HEALTH CARE EDUCATION/TRAINING PROGRAM

## 2025-03-04 PROCEDURE — 1159F MED LIST DOCD IN RCRD: CPT | Performed by: STUDENT IN AN ORGANIZED HEALTH CARE EDUCATION/TRAINING PROGRAM

## 2025-03-04 PROCEDURE — 3050F LDL-C >= 130 MG/DL: CPT | Performed by: STUDENT IN AN ORGANIZED HEALTH CARE EDUCATION/TRAINING PROGRAM

## 2025-03-04 PROCEDURE — 3008F BODY MASS INDEX DOCD: CPT | Performed by: STUDENT IN AN ORGANIZED HEALTH CARE EDUCATION/TRAINING PROGRAM

## 2025-03-04 PROCEDURE — 3077F SYST BP >= 140 MM HG: CPT | Performed by: STUDENT IN AN ORGANIZED HEALTH CARE EDUCATION/TRAINING PROGRAM

## 2025-03-04 PROCEDURE — 99204 OFFICE O/P NEW MOD 45 MIN: CPT | Performed by: STUDENT IN AN ORGANIZED HEALTH CARE EDUCATION/TRAINING PROGRAM

## 2025-03-04 PROCEDURE — 1126F AMNT PAIN NOTED NONE PRSNT: CPT | Performed by: STUDENT IN AN ORGANIZED HEALTH CARE EDUCATION/TRAINING PROGRAM

## 2025-03-04 PROCEDURE — 3044F HG A1C LEVEL LT 7.0%: CPT | Performed by: STUDENT IN AN ORGANIZED HEALTH CARE EDUCATION/TRAINING PROGRAM

## 2025-03-04 PROCEDURE — 4010F ACE/ARB THERAPY RXD/TAKEN: CPT | Performed by: STUDENT IN AN ORGANIZED HEALTH CARE EDUCATION/TRAINING PROGRAM

## 2025-03-04 PROCEDURE — 99214 OFFICE O/P EST MOD 30 MIN: CPT | Mod: 57 | Performed by: STUDENT IN AN ORGANIZED HEALTH CARE EDUCATION/TRAINING PROGRAM

## 2025-03-04 PROCEDURE — 1036F TOBACCO NON-USER: CPT | Performed by: STUDENT IN AN ORGANIZED HEALTH CARE EDUCATION/TRAINING PROGRAM

## 2025-03-04 PROCEDURE — 3078F DIAST BP <80 MM HG: CPT | Performed by: STUDENT IN AN ORGANIZED HEALTH CARE EDUCATION/TRAINING PROGRAM

## 2025-03-04 RX ORDER — CEFAZOLIN SODIUM 2 G/100ML
2 INJECTION, SOLUTION INTRAVENOUS ONCE
OUTPATIENT
Start: 2025-03-04 | End: 2025-03-04

## 2025-03-04 ASSESSMENT — PAIN SCALES - GENERAL: PAINLEVEL_OUTOF10: 0-NO PAIN

## 2025-03-04 NOTE — LETTER
March 4, 2025     Krista Ricketts MD  [address] [address]  [address]  [place] [address]    Patient: Melisa Oseguera   YOB: 1950   Date of Visit: 3/4/2025       Dear Dr. Krista Ricketts MD:    Thank you for referring Melisa Oseguera to me for evaluation. Below are my notes for this consultation.  If you have questions, please do not hesitate to call me. I look forward to following your patient along with you.       Sincerely,     Tawanda Serrano MD      CC: No Recipients  ______________________________________________________________________________________    Assessment and Plan:  Melisa Oseguera is a 74 y.o. female referred by Krista Ricketts with a newly diagnosed melanoma of the right upper back that is at least 0.8 mm thick, cT1b. After discussing the risks and benefits of wide local excision with sentinel lymph node biopsy the patient understands and would like to proceed. We will look for OR time at [place] in the coming weeks. I believe this will close primarily with a 1 cm margin.     Tumor board discussion is pending    I spent 60 minutes in the professional and overall care of this patient.    Tawanda Serrano MD  Assistant Professor of Surgery  Division of Surgical Oncology  [phone]  [email or web address]      History Of Present Illness  Referring provider: Krista Ricketts  Diagnosis: melanoma  Location: right upper back  Thickness: at least 0.8 mm  Margins: Mis at margins  Subtype: ss  High-risk features: ulcerated, 1 mitosis    All other systems have been reviewed and are negative except as noted in the HPI.    I personally reviewed all necessary laboratory results, pathology reports, and radiologic images for this patient.    Past Medical History  She has no past medical history on file.    Surgical History  She has a past surgical history that includes Tonsillectomy (03/21/2016); Other surgical history (12/16/2021); and Colonoscopy (11/21/2022).     Social History  She reports  that she quit smoking about 47 years ago. Her smoking use included cigarettes. She has never used smokeless tobacco. She reports current alcohol use. She reports that she does not use drugs.    Family History  Family History   Problem Relation Name Age of Onset   • Hypertension Mother     • Sleep apnea Mother     • Other (cva) Father     • Sleep apnea Father     • Hypertension Sister     • Breast cancer Mother's Sister     • Breast cancer Father's Sister          Allergies  Patient has no known allergies.     Last Recorded Vitals  There were no vitals taken for this visit.    Physical Exam  General: no acute distress, well-nourished  Eyes: intact EOM, no scleral icterus  ENT: hearing intact, no drainage  Respiratory: symmetric chest rise, no cough  Cardiovascular: intact distal pulses, no pitting edema  Abdominal: Soft, nontender  Musculoskeletal: no deformities, intact strength  Integumentary: Healing biopsy site, no lymphadenopathy  Neuro: no focal deficits, sensation intact  Psych: normal mood and affect     Relevant Results  FINAL DIAGNOSIS   7 SLIDES, [employer], #[id number] (BX: 02/05/2025)     A. SKIN, RIGHT SUPERIOR UPPER BACK, SHAVE BIOPSY:  ULCERATED MALIGNANT MELANOMA, BRESLOW THICKNESS 0.8 MM, PRESENT ON THE DEEP AND PERIPHERAL MARGIN AND DERMAL NEVUS, PRESENT ON THE DEEP MARGIN, SEE NOTE.     Note: Microscopic examination reveals a specimen that extends into the dermis. There is an area of ulceration and there is an asymmetric proliferation of nested and single melanocytes throughout all layers of the epidermis. There are nests of benign-appearing melanocytes in the dermis and nests of atypical melanocytes. The atypical melanocytes in the epidermis and dermis have mildly enlarged nuclei with moderate cytoplasm. They stain with antibodies against Melan-A, SOX-10 and PRAME. The benign-appearing melanocytes in the dermis do not stain with antibodies against PRAME. A  D2-40 stain was reviewed. On the H&E there is an area suspicious for vascular invasion.      B. SKIN, LEFT MID-UPPER BACK, SHAVE BIOPSY:  HEMANGIOMA, PRESENT ON THE DEEP MARGIN.     ** Electronically signed out by Yanni Burch MD **      Electronically signed by Yanni Burch MD on 3/3/2025 at 1011   Case Summary Report   MELANOMA OF THE SKIN: Biopsy   8th Edition - Protocol posted: 3/23/2022MELANOMA OF THE SKIN: BIOPSY - A  SPECIMEN   Procedure  Biopsy, shave   Specimen Laterality  Right   TUMOR   Tumor Site  Skin of trunk: Right superior upper back          Histologic Type  Superficial spreading melanoma (low-cumulative sun damage (CSD) melanoma)   Maximum Tumor (Breslow) Thickness (Millimeters)  0.8 mm   Ulceration  Present   Extent of Ulceration (Millimeters)  1 mm   Anatomic (Britton) Level  IV (melanoma invades reticular dermis)   Mitotic Rate  1 mitoses per mm2   Microsatellite(s)  Not identified   Lymphovascular Invasion  Present   Neurotropism  Not identified   Tumor-Infiltrating Lymphocytes  Present, nonbrisk   Tumor Regression  Not identified   MARGINS     Margin Status for Invasive Melanoma  Invasive melanoma present at margin   Margin(s) Involved by Invasive Melanoma  Peripheral   Margin Status for Melanoma in situ  Melanoma in situ present at margin   Margin(s) Involved by Melanoma in Situ  Peripheral     Deep   PATHOLOGIC STAGE CLASSIFICATION (pTNM, AJCC 8th Edition)     pT Category  pT1b

## 2025-03-07 DIAGNOSIS — C43.59 MALIGNANT MELANOMA OF UPPER BACK (MULTI): Primary | ICD-10-CM

## 2025-03-10 NOTE — PREPROCEDURE INSTRUCTIONS

## 2025-03-13 NOTE — TUMOR BOARD NOTE
General Patient Information  Name:  Melisa Oseguera  Evaluation #:  1  Conference Date:  3/17/2025  YOB: 1950  MRN:  [id number]  Program Physician(s):  Josef Acosta  Referring Physician(s):  Krista Hernandez      Summary   Stage:  Esteban ([id number]) ; Melanoma 5 year survival: 99%    Assessment:  Right superior upper back with an ulcerated superficial spreading melanoma, Breslow of 0.8 mm, present on the deep and peripheral margin    Recommendation:  WLE with 1 cm margins and consider SLNB.    Review Multidisciplinary Cutaneous Oncology Conference recommendation with patient.  Continue routine follow up and total body skin exams with Krista Ricketts.    Follow Up:  Krista Hernandez      History and Physical Exam  Dermatologic History:   74 y.o. female with a biopsy of the right superior upper back on 2/5/2025 showing an ulcerated melanoma, superficial spreading type, Breslow: 0.8 mm, present on the deep and peripheral margins.    Scheduled for WLE with Dr. Serrano on 3/24    Past Medical History:    Past Medical History:   Diagnosis Date    CPAP (continuous positive airway pressure) dependence     Hypertension     Melanoma in situ of back (Multi)     Ocular migraine     Sleep apnea        Family History of DNS/MM:  Unknown    Skin:  Healing biopsy site     Lymphatic:  no lymphadenopathy       Pathology  Dermpath Consult: [id number]     7 SLIDES, [employer] [facility], #[id number] (BX: 02/05/2025)     A. SKIN, RIGHT SUPERIOR UPPER BACK, SHAVE BIOPSY:  ULCERATED MALIGNANT MELANOMA, BRESLOW THICKNESS 0.8 MM, PRESENT ON THE DEEP AND PERIPHERAL MARGIN AND DERMAL NEVUS, PRESENT ON THE DEEP MARGIN, SEE NOTE.     Note: Microscopic examination reveals a specimen that extends into the dermis. There is an area of ulceration and there is an asymmetric proliferation of nested and single melanocytes throughout all layers of the epidermis. There are nests of  benign-appearing melanocytes in the dermis and nests of atypical melanocytes. The atypical melanocytes in the epidermis and dermis have mildly enlarged nuclei with moderate cytoplasm. They stain with antibodies against Melan-A, SOX-10 and PRAME. The benign-appearing melanocytes in the dermis do not stain with antibodies against PRAME. A D2-40 stain was reviewed. On the H&E there is an area suspicious for vascular invasion.      B. SKIN, LEFT MID-UPPER BACK, SHAVE BIOPSY:  HEMANGIOMA, PRESENT ON THE DEEP MARGIN.     ** Electronically signed out by Yanni Burch MD **    SPECIMEN   Procedure  Biopsy, shave   Specimen Laterality  Right   TUMOR   Tumor Site  Skin of trunk: Right superior upper back          Histologic Type  Superficial spreading melanoma (low-cumulative sun damage (CSD) melanoma)   Maximum Tumor (Breslow) Thickness (Millimeters)  0.8 mm   Ulceration  Present   Extent of Ulceration (Millimeters)  1 mm   Anatomic (Britton) Level  IV (melanoma invades reticular dermis)   Mitotic Rate  1 mitoses per mm2   Microsatellite(s)  Not identified   Lymphovascular Invasion  Present   Neurotropism  Not identified   Tumor-Infiltrating Lymphocytes  Present, nonbrisk   Tumor Regression  Not identified   MARGINS     Margin Status for Invasive Melanoma  Invasive melanoma present at margin   Margin(s) Involved by Invasive Melanoma  Peripheral   Margin Status for Melanoma in situ  Melanoma in situ present at margin   Margin(s) Involved by Melanoma in Situ  Peripheral     Deep   PATHOLOGIC STAGE CLASSIFICATION (pTNM, AJCC 8th Edition)     pT Category  pT1b       Radiology      Clinical Images  3/4/2025

## 2025-03-17 ENCOUNTER — TUMOR BOARD CONFERENCE (OUTPATIENT)
Dept: HEMATOLOGY/ONCOLOGY | Facility: HOSPITAL | Age: 75
End: 2025-03-17
Payer: MEDICARE

## 2025-03-24 ENCOUNTER — ANESTHESIA EVENT (OUTPATIENT)
Dept: OPERATING ROOM | Facility: HOSPITAL | Age: 75
End: 2025-03-24
Payer: MEDICARE

## 2025-03-24 ENCOUNTER — HOSPITAL ENCOUNTER (OUTPATIENT)
Facility: HOSPITAL | Age: 75
Setting detail: OUTPATIENT SURGERY
Discharge: HOME | End: 2025-03-24
Attending: STUDENT IN AN ORGANIZED HEALTH CARE EDUCATION/TRAINING PROGRAM | Admitting: STUDENT IN AN ORGANIZED HEALTH CARE EDUCATION/TRAINING PROGRAM
Payer: MEDICARE

## 2025-03-24 ENCOUNTER — HOSPITAL ENCOUNTER (OUTPATIENT)
Dept: RADIOLOGY | Facility: HOSPITAL | Age: 75
Setting detail: OUTPATIENT SURGERY
Discharge: HOME | End: 2025-03-24
Payer: MEDICARE

## 2025-03-24 ENCOUNTER — ANESTHESIA (OUTPATIENT)
Dept: OPERATING ROOM | Facility: HOSPITAL | Age: 75
End: 2025-03-24
Payer: MEDICARE

## 2025-03-24 ENCOUNTER — APPOINTMENT (OUTPATIENT)
Dept: CARDIOLOGY | Facility: HOSPITAL | Age: 75
End: 2025-03-24
Payer: MEDICARE

## 2025-03-24 VITALS
HEIGHT: 64 IN | HEART RATE: 67 BPM | RESPIRATION RATE: 16 BRPM | DIASTOLIC BLOOD PRESSURE: 63 MMHG | TEMPERATURE: 96.8 F | SYSTOLIC BLOOD PRESSURE: 138 MMHG | BODY MASS INDEX: 27.55 KG/M2 | WEIGHT: 161.38 LBS | OXYGEN SATURATION: 97 %

## 2025-03-24 DIAGNOSIS — C43.59 MALIGNANT MELANOMA OF UPPER BACK (MULTI): Primary | ICD-10-CM

## 2025-03-24 DIAGNOSIS — C43.59 MALIGNANT MELANOMA OF UPPER BACK (MULTI): ICD-10-CM

## 2025-03-24 LAB
ATRIAL RATE: 66 BPM
GLUCOSE BLD MANUAL STRIP-MCNC: 150 MG/DL (ref 74–99)
P AXIS: 57 DEGREES
P OFFSET: 198 MS
P ONSET: 140 MS
PR INTERVAL: 154 MS
Q ONSET: 217 MS
QRS COUNT: 11 BEATS
QRS DURATION: 98 MS
QT INTERVAL: 390 MS
QTC CALCULATION(BAZETT): 408 MS
QTC FREDERICIA: 403 MS
R AXIS: 50 DEGREES
T AXIS: 35 DEGREES
T OFFSET: 412 MS
VENTRICULAR RATE: 66 BPM

## 2025-03-24 PROCEDURE — 7100000001 HC RECOVERY ROOM TIME - INITIAL BASE CHARGE: Performed by: STUDENT IN AN ORGANIZED HEALTH CARE EDUCATION/TRAINING PROGRAM

## 2025-03-24 PROCEDURE — 88341 IMHCHEM/IMCYTCHM EA ADD ANTB: CPT | Performed by: DERMATOLOGY

## 2025-03-24 PROCEDURE — 2500000005 HC RX 250 GENERAL PHARMACY W/O HCPCS: Performed by: STUDENT IN AN ORGANIZED HEALTH CARE EDUCATION/TRAINING PROGRAM

## 2025-03-24 PROCEDURE — 88305 TISSUE EXAM BY PATHOLOGIST: CPT | Performed by: DERMATOLOGY

## 2025-03-24 PROCEDURE — 13102 CMPLX RPR TRUNK ADDL 5CM/<: CPT | Performed by: STUDENT IN AN ORGANIZED HEALTH CARE EDUCATION/TRAINING PROGRAM

## 2025-03-24 PROCEDURE — 78830 RP LOCLZJ TUM SPECT W/CT 1: CPT

## 2025-03-24 PROCEDURE — 2720000007 HC OR 272 NO HCPCS: Performed by: STUDENT IN AN ORGANIZED HEALTH CARE EDUCATION/TRAINING PROGRAM

## 2025-03-24 PROCEDURE — 88307 TISSUE EXAM BY PATHOLOGIST: CPT | Performed by: DERMATOLOGY

## 2025-03-24 PROCEDURE — A9520 TC99 TILMANOCEPT DIAG 0.5MCI: HCPCS | Performed by: STUDENT IN AN ORGANIZED HEALTH CARE EDUCATION/TRAINING PROGRAM

## 2025-03-24 PROCEDURE — 3700000002 HC GENERAL ANESTHESIA TIME - EACH INCREMENTAL 1 MINUTE: Performed by: STUDENT IN AN ORGANIZED HEALTH CARE EDUCATION/TRAINING PROGRAM

## 2025-03-24 PROCEDURE — 38900 IO MAP OF SENT LYMPH NODE: CPT | Performed by: STUDENT IN AN ORGANIZED HEALTH CARE EDUCATION/TRAINING PROGRAM

## 2025-03-24 PROCEDURE — 7100000009 HC PHASE TWO TIME - INITIAL BASE CHARGE: Performed by: STUDENT IN AN ORGANIZED HEALTH CARE EDUCATION/TRAINING PROGRAM

## 2025-03-24 PROCEDURE — 13101 CMPLX RPR TRUNK 2.6-7.5 CM: CPT | Performed by: STUDENT IN AN ORGANIZED HEALTH CARE EDUCATION/TRAINING PROGRAM

## 2025-03-24 PROCEDURE — 2500000004 HC RX 250 GENERAL PHARMACY W/ HCPCS (ALT 636 FOR OP/ED): Performed by: STUDENT IN AN ORGANIZED HEALTH CARE EDUCATION/TRAINING PROGRAM

## 2025-03-24 PROCEDURE — 3600000008 HC OR TIME - EACH INCREMENTAL 1 MINUTE - PROCEDURE LEVEL THREE: Performed by: STUDENT IN AN ORGANIZED HEALTH CARE EDUCATION/TRAINING PROGRAM

## 2025-03-24 PROCEDURE — 93010 ELECTROCARDIOGRAM REPORT: CPT | Performed by: INTERNAL MEDICINE

## 2025-03-24 PROCEDURE — 78195 LYMPH SYSTEM IMAGING: CPT | Performed by: STUDENT IN AN ORGANIZED HEALTH CARE EDUCATION/TRAINING PROGRAM

## 2025-03-24 PROCEDURE — 7100000010 HC PHASE TWO TIME - EACH INCREMENTAL 1 MINUTE: Performed by: STUDENT IN AN ORGANIZED HEALTH CARE EDUCATION/TRAINING PROGRAM

## 2025-03-24 PROCEDURE — 3700000001 HC GENERAL ANESTHESIA TIME - INITIAL BASE CHARGE: Performed by: STUDENT IN AN ORGANIZED HEALTH CARE EDUCATION/TRAINING PROGRAM

## 2025-03-24 PROCEDURE — 2500000004 HC RX 250 GENERAL PHARMACY W/ HCPCS (ALT 636 FOR OP/ED): Performed by: ANESTHESIOLOGY

## 2025-03-24 PROCEDURE — 3430000001 HC RX 343 DIAGNOSTIC RADIOPHARMACEUTICALS: Performed by: STUDENT IN AN ORGANIZED HEALTH CARE EDUCATION/TRAINING PROGRAM

## 2025-03-24 PROCEDURE — 78830 RP LOCLZJ TUM SPECT W/CT 1: CPT | Performed by: STUDENT IN AN ORGANIZED HEALTH CARE EDUCATION/TRAINING PROGRAM

## 2025-03-24 PROCEDURE — 38525 BIOPSY/REMOVAL LYMPH NODES: CPT | Performed by: STUDENT IN AN ORGANIZED HEALTH CARE EDUCATION/TRAINING PROGRAM

## 2025-03-24 PROCEDURE — 93005 ELECTROCARDIOGRAM TRACING: CPT

## 2025-03-24 PROCEDURE — 11606 EXC TR-EXT MAL+MARG >4 CM: CPT | Performed by: STUDENT IN AN ORGANIZED HEALTH CARE EDUCATION/TRAINING PROGRAM

## 2025-03-24 PROCEDURE — 7100000002 HC RECOVERY ROOM TIME - EACH INCREMENTAL 1 MINUTE: Performed by: STUDENT IN AN ORGANIZED HEALTH CARE EDUCATION/TRAINING PROGRAM

## 2025-03-24 PROCEDURE — 3600000003 HC OR TIME - INITIAL BASE CHARGE - PROCEDURE LEVEL THREE: Performed by: STUDENT IN AN ORGANIZED HEALTH CARE EDUCATION/TRAINING PROGRAM

## 2025-03-24 PROCEDURE — 82947 ASSAY GLUCOSE BLOOD QUANT: CPT

## 2025-03-24 PROCEDURE — 88342 IMHCHEM/IMCYTCHM 1ST ANTB: CPT | Performed by: DERMATOLOGY

## 2025-03-24 RX ORDER — ONDANSETRON HYDROCHLORIDE 2 MG/ML
4 INJECTION, SOLUTION INTRAVENOUS ONCE AS NEEDED
Status: DISCONTINUED | OUTPATIENT
Start: 2025-03-24 | End: 2025-03-24 | Stop reason: HOSPADM

## 2025-03-24 RX ORDER — ROCURONIUM BROMIDE 10 MG/ML
INJECTION, SOLUTION INTRAVENOUS AS NEEDED
Status: DISCONTINUED | OUTPATIENT
Start: 2025-03-24 | End: 2025-03-24

## 2025-03-24 RX ORDER — LIDOCAINE HYDROCHLORIDE 10 MG/ML
0.1 INJECTION, SOLUTION EPIDURAL; INFILTRATION; INTRACAUDAL; PERINEURAL ONCE
Status: DISCONTINUED | OUTPATIENT
Start: 2025-03-24 | End: 2025-03-24 | Stop reason: HOSPADM

## 2025-03-24 RX ORDER — ACETAMINOPHEN 325 MG/1
650 TABLET ORAL EVERY 4 HOURS PRN
Status: DISCONTINUED | OUTPATIENT
Start: 2025-03-24 | End: 2025-03-24 | Stop reason: HOSPADM

## 2025-03-24 RX ORDER — SODIUM CHLORIDE, SODIUM LACTATE, POTASSIUM CHLORIDE, CALCIUM CHLORIDE 600; 310; 30; 20 MG/100ML; MG/100ML; MG/100ML; MG/100ML
100 INJECTION, SOLUTION INTRAVENOUS CONTINUOUS
Status: DISCONTINUED | OUTPATIENT
Start: 2025-03-24 | End: 2025-03-24 | Stop reason: HOSPADM

## 2025-03-24 RX ORDER — SODIUM CHLORIDE 0.9 G/100ML
INJECTION, SOLUTION IRRIGATION AS NEEDED
Status: DISCONTINUED | OUTPATIENT
Start: 2025-03-24 | End: 2025-03-24 | Stop reason: HOSPADM

## 2025-03-24 RX ORDER — OXYCODONE HYDROCHLORIDE 5 MG/1
5 TABLET ORAL EVERY 4 HOURS PRN
Status: DISCONTINUED | OUTPATIENT
Start: 2025-03-24 | End: 2025-03-24 | Stop reason: HOSPADM

## 2025-03-24 RX ORDER — TRAMADOL HYDROCHLORIDE 50 MG/1
50 TABLET ORAL 2 TIMES DAILY PRN
Qty: 5 TABLET | Refills: 0 | Status: SHIPPED | OUTPATIENT
Start: 2025-03-24 | End: 2025-03-29

## 2025-03-24 RX ORDER — MIDAZOLAM HYDROCHLORIDE 1 MG/ML
INJECTION, SOLUTION INTRAMUSCULAR; INTRAVENOUS AS NEEDED
Status: DISCONTINUED | OUTPATIENT
Start: 2025-03-24 | End: 2025-03-24

## 2025-03-24 RX ORDER — ONDANSETRON HYDROCHLORIDE 2 MG/ML
INJECTION, SOLUTION INTRAVENOUS AS NEEDED
Status: DISCONTINUED | OUTPATIENT
Start: 2025-03-24 | End: 2025-03-24

## 2025-03-24 RX ORDER — LIDOCAINE HYDROCHLORIDE 20 MG/ML
INJECTION, SOLUTION INFILTRATION; PERINEURAL AS NEEDED
Status: DISCONTINUED | OUTPATIENT
Start: 2025-03-24 | End: 2025-03-24

## 2025-03-24 RX ORDER — SODIUM CHLORIDE, SODIUM LACTATE, POTASSIUM CHLORIDE, CALCIUM CHLORIDE 600; 310; 30; 20 MG/100ML; MG/100ML; MG/100ML; MG/100ML
50 INJECTION, SOLUTION INTRAVENOUS CONTINUOUS
Status: DISCONTINUED | OUTPATIENT
Start: 2025-03-24 | End: 2025-03-24 | Stop reason: HOSPADM

## 2025-03-24 RX ORDER — CEFAZOLIN SODIUM 2 G/100ML
2 INJECTION, SOLUTION INTRAVENOUS ONCE
Status: COMPLETED | OUTPATIENT
Start: 2025-03-24 | End: 2025-03-24

## 2025-03-24 RX ORDER — FENTANYL CITRATE 50 UG/ML
12.5 INJECTION, SOLUTION INTRAMUSCULAR; INTRAVENOUS EVERY 5 MIN PRN
Status: DISCONTINUED | OUTPATIENT
Start: 2025-03-24 | End: 2025-03-24 | Stop reason: HOSPADM

## 2025-03-24 RX ORDER — PROPOFOL 10 MG/ML
INJECTION, EMULSION INTRAVENOUS AS NEEDED
Status: DISCONTINUED | OUTPATIENT
Start: 2025-03-24 | End: 2025-03-24

## 2025-03-24 RX ORDER — ALBUTEROL SULFATE 0.83 MG/ML
2.5 SOLUTION RESPIRATORY (INHALATION) ONCE AS NEEDED
Status: DISCONTINUED | OUTPATIENT
Start: 2025-03-24 | End: 2025-03-24 | Stop reason: HOSPADM

## 2025-03-24 RX ORDER — HYDRALAZINE HYDROCHLORIDE 20 MG/ML
5 INJECTION INTRAMUSCULAR; INTRAVENOUS EVERY 30 MIN PRN
Status: DISCONTINUED | OUTPATIENT
Start: 2025-03-24 | End: 2025-03-24 | Stop reason: HOSPADM

## 2025-03-24 RX ORDER — LABETALOL HYDROCHLORIDE 5 MG/ML
5 INJECTION, SOLUTION INTRAVENOUS ONCE AS NEEDED
Status: DISCONTINUED | OUTPATIENT
Start: 2025-03-24 | End: 2025-03-24 | Stop reason: HOSPADM

## 2025-03-24 RX ORDER — PHENYLEPHRINE HCL IN 0.9% NACL 0.4MG/10ML
SYRINGE (ML) INTRAVENOUS AS NEEDED
Status: DISCONTINUED | OUTPATIENT
Start: 2025-03-24 | End: 2025-03-24

## 2025-03-24 RX ORDER — FENTANYL CITRATE 50 UG/ML
INJECTION, SOLUTION INTRAMUSCULAR; INTRAVENOUS AS NEEDED
Status: DISCONTINUED | OUTPATIENT
Start: 2025-03-24 | End: 2025-03-24

## 2025-03-24 RX ORDER — FENTANYL CITRATE 50 UG/ML
25 INJECTION, SOLUTION INTRAMUSCULAR; INTRAVENOUS EVERY 5 MIN PRN
Status: DISCONTINUED | OUTPATIENT
Start: 2025-03-24 | End: 2025-03-24 | Stop reason: HOSPADM

## 2025-03-24 RX ORDER — OXYCODONE HYDROCHLORIDE 5 MG/1
10 TABLET ORAL EVERY 4 HOURS PRN
Status: DISCONTINUED | OUTPATIENT
Start: 2025-03-24 | End: 2025-03-24 | Stop reason: HOSPADM

## 2025-03-24 RX ORDER — METOCLOPRAMIDE HYDROCHLORIDE 5 MG/ML
10 INJECTION INTRAMUSCULAR; INTRAVENOUS ONCE AS NEEDED
Status: DISCONTINUED | OUTPATIENT
Start: 2025-03-24 | End: 2025-03-24 | Stop reason: HOSPADM

## 2025-03-24 RX ADMIN — ONDANSETRON 4 MG: 2 INJECTION, SOLUTION INTRAMUSCULAR; INTRAVENOUS at 10:20

## 2025-03-24 RX ADMIN — FENTANYL CITRATE 50 MCG: 50 INJECTION, SOLUTION INTRAMUSCULAR; INTRAVENOUS at 09:33

## 2025-03-24 RX ADMIN — ROCURONIUM BROMIDE 50 MG: 10 SOLUTION INTRAVENOUS at 09:33

## 2025-03-24 RX ADMIN — SUGAMMADEX 200 MG: 100 INJECTION, SOLUTION INTRAVENOUS at 11:13

## 2025-03-24 RX ADMIN — Medication 0.65 MILLICURIE: at 07:27

## 2025-03-24 RX ADMIN — CEFAZOLIN SODIUM 2 G: 2 INJECTION, SOLUTION INTRAVENOUS at 09:45

## 2025-03-24 RX ADMIN — PROPOFOL 40 MG: 10 INJECTION, EMULSION INTRAVENOUS at 10:02

## 2025-03-24 RX ADMIN — PROPOFOL 30 MG: 10 INJECTION, EMULSION INTRAVENOUS at 10:35

## 2025-03-24 RX ADMIN — DEXAMETHASONE SODIUM PHOSPHATE 4 MG: 4 INJECTION, SOLUTION INTRAMUSCULAR; INTRAVENOUS at 09:45

## 2025-03-24 RX ADMIN — MIDAZOLAM 2 MG: 1 INJECTION INTRAMUSCULAR; INTRAVENOUS at 09:28

## 2025-03-24 RX ADMIN — FENTANYL CITRATE 50 MCG: 50 INJECTION, SOLUTION INTRAMUSCULAR; INTRAVENOUS at 10:03

## 2025-03-24 RX ADMIN — Medication 100 MCG: at 10:50

## 2025-03-24 RX ADMIN — LIDOCAINE HYDROCHLORIDE 60 MG: 20 INJECTION, SOLUTION INFILTRATION; PERINEURAL at 09:33

## 2025-03-24 RX ADMIN — SODIUM CHLORIDE, POTASSIUM CHLORIDE, SODIUM LACTATE AND CALCIUM CHLORIDE 50 ML/HR: 600; 310; 30; 20 INJECTION, SOLUTION INTRAVENOUS at 06:35

## 2025-03-24 RX ADMIN — PROPOFOL 130 MG: 10 INJECTION, EMULSION INTRAVENOUS at 09:33

## 2025-03-24 SDOH — HEALTH STABILITY: MENTAL HEALTH: CURRENT SMOKER: 0

## 2025-03-24 ASSESSMENT — COLUMBIA-SUICIDE SEVERITY RATING SCALE - C-SSRS
2. HAVE YOU ACTUALLY HAD ANY THOUGHTS OF KILLING YOURSELF?: NO
1. IN THE PAST MONTH, HAVE YOU WISHED YOU WERE DEAD OR WISHED YOU COULD GO TO SLEEP AND NOT WAKE UP?: NO
6. HAVE YOU EVER DONE ANYTHING, STARTED TO DO ANYTHING, OR PREPARED TO DO ANYTHING TO END YOUR LIFE?: NO

## 2025-03-24 ASSESSMENT — PAIN SCALES - GENERAL
PAINLEVEL_OUTOF10: 2
PAINLEVEL_OUTOF10: 2
PAIN_LEVEL: 2
PAINLEVEL_OUTOF10: 0 - NO PAIN
PAINLEVEL_OUTOF10: 2
PAINLEVEL_OUTOF10: 0 - NO PAIN
PAINLEVEL_OUTOF10: 1
PAINLEVEL_OUTOF10: 0 - NO PAIN

## 2025-03-24 ASSESSMENT — PAIN - FUNCTIONAL ASSESSMENT
PAIN_FUNCTIONAL_ASSESSMENT: 0-10

## 2025-03-24 ASSESSMENT — PAIN DESCRIPTION - DESCRIPTORS: DESCRIPTORS: SORE

## 2025-03-24 NOTE — OP NOTE
WIDE LOCAL EXCISION RIGHT UPPER BACK, [place] Lymph Node Biopsy, RIGHT AXILLA Operative Note     Date: 3/24/2025  OR Location: [facility]    Name: Melisa Oseguera, : 1950, Age: 74 y.o., MRN: [id number], Sex: female    Diagnosis  Pre-op Diagnosis      * Malignant melanoma of upper back (Multi) [C43.59] Post-op Diagnosis     * Malignant melanoma of upper back (Multi) [C43.59]     Procedures  WIDE LOCAL EXCISION RIGHT UPPER BACK  01877 - NE EXCISION TUMOR SOFT TIS BACK/FLANK SUBQ 3 CM/>    [place] Lymph Node Biopsy, RIGHT AXILLA  17611 - NE BX/EXC LYMPH NODE OPEN DEEP AXILLARY NODE      Surgeons      * Tawanda Serrano - Primary    Resident/Fellow/Other Assistant:  Surgeons and Role:  * No surgeons found with a matching role *    Staff:   Circulator: Radha Parraub Person: Sesar Akers Scrub: Heidi    Anesthesia Staff: Anesthesiologist: Caroline Alexandra MD    Procedure Summary  Anesthesia: General  ASA: II  Estimated Blood Loss: 5mL  Intra-op Medications:   Administrations occurring from 0845 to 1115 on 25:   Medication Name Total Dose   sodium chloride 0.9 % irrigation solution 1,000 mL   BUPivacaine HCl (Marcaine) 0.5 % (5 mg/mL) 15 mL, lidocaine PF (Xylocaine) 10 mg/mL (1 %) 15 mL syringe 20 mL   dexAMETHasone (Decadron) 4 mg/mL 4 mg   fentaNYL PF 0.05 mg/mL 100 mcg   lidocaine (Xylocaine) injection 2 % 60 mg   midazolam (Versed) 1 mg/1 mL 2 mg   ondansetron 2 mg/mL 4 mg   phenylephrine 40 mcg/mL syringe 10 mL 100 mcg   propofol (Diprivan) injection 10 mg/mL 200 mg   rocuronium (ZeMuron) 50 mg/5 mL injection 50 mg   ceFAZolin (Ancef) 2 g in dextrose (iso)  mL 2 g              Anesthesia Record               Intraprocedure I/O Totals       None           Specimen:   ID Type Source Tests Collected by Time   1 : WIDE LOCAL EXCISION RIGHT UPPER BACK, SHORT STITCH SUPERIOR, LONG STITCH LATERAL Tissue SKIN WIDE EXCISION DERMPATH LAB- DERMATOPATHOLOGY Tawanda Serrano MD 3/24/2025 1005   2 :  SENTINEL LYMPH NODE # 1 RIGHT AXILLA Tissue SENTINEL LYMPH NODE MELANOMA DERMPATH LAB- DERMATOPATHOLOGY Tawanda Serrano MD 3/24/2025 1100   3 : SENTINEL LYMPH NODE # 2 RIGHT AXILLA Tissue SENTINEL LYMPH NODE MELANOMA DERMPATH LAB- DERMATOPATHOLOGY Tawanda Serrano MD 3/24/2025 1109                 INDICATIONS FOR SURGERY  Referring provider: Krista Ricketts  Diagnosis: melanoma  Location: right upper back  Thickness: at least 0.8 mm      DETAILS OF PROCEDURE    Wide Local Excision for Primary Cutaneous Melanoma  Operation performed with curative intent Yes   Original Breslow thickness of the lesion 0.8 mm (to the tenth of a millimeter)   Clinical margin width  1 cm   Depth of excision Full-thickness skin/subcutaneous tissue down to fascia (melanoma)     Final dimensions of excision: 3.5 cm x 11 cm  Specimen marking stitches: short superior, long lateral  Excision closure: 4-0 monocryl and Dermabond  Location of sentinel nodes: right axilla  Number of sentinel lymph nodes: 2    The patient [facility] for the aforementioned procedure. Consent was obtained, history and physical performed, and questions were answered. The patient was moved into the operating room and induced under anesthesia. A timeout was performed prior to surgery.       For the wide local excision, the surgical site was prepped and draped in the usual sterile fashion. A margin was drawn about the lesion and this was extended into a 3:1 elliptical incision along natural body lines to facilitate a tension-free closure. Local anesthetic was injected and an incision was made along this ellipse. Electrocautery was used to dissected down to the muscular fascia and the specimen was then removed and oriented with sutures as indicated above. This was sent for permanent dermatopathology. Circumferential soft tissue flaps were created to facilitate closure. The wound was irrigated and hemostasis was achieved. The wound was then closed in a  complex multilayer fashion using deep 2-0 Vicryl [id number], interrupted 3-0 Vicryl deep dermals, and a cutaneous closure indicated above. The skin was dressed with Dermabond.       For the sentinel lymph node biopsy, the lymph node basin was prepped and draped in the usual sterile fashion after verifying radiotracer presence in this basin with the neoprobe. A 3 cm incision was made over the radioactivity and dissection was carried out with electrocautery to identify the sentinel node. The node/nodes was/were removed using clips and suture as needed to control blood vessels and lymphatics and sent for permanent pathology. The wound was then irrigated and hemostasis achieved. This was closed in a multilayer fashion using a deep 2-0 Vicryl figure-of-eight, interrupted deep 3-0 Vicryl, and a running subcuticular 4-0 Monocryl. The skin was dressed with Dermabond.       The patient was awoken and returned to the PACU in anticipation of discharge home. I was present scrubbed and directed all operative decision-making.     Please note that we will be billing for the ROSS's first assist as he/she was critical for successful completion of the case including positioning of the body, retraction, suture management,  and wound closure. There was no qualified resident available for the case.

## 2025-03-24 NOTE — ANESTHESIA POSTPROCEDURE EVALUATION
Patient: Melisa Oseguera    Procedure Summary       Date: 03/24/25 Room / Location: [facility] / [facility]    Anesthesia Start: 0928 Anesthesia Stop: 1123    Procedures:       WIDE LOCAL EXCISION RIGHT UPPER BACK      [place] Lymph Node Biopsy, RIGHT AXILLA Diagnosis:       Malignant melanoma of upper back (Multi)      (Malignant melanoma of upper back (Multi) [C43.59])    Surgeons: Tawanda Serrano MD Responsible Provider: Caroline Alexandra MD    Anesthesia Type: general ASA Status: 2            Anesthesia Type: general    Vitals Value Taken Time   /81 03/24/25 1126   Temp 36.0 03/24/25 1126   Pulse 65 03/24/25 1126   Resp 16 03/24/25 1126   SpO2 100 03/24/25 1126       Anesthesia Post Evaluation    Patient location during evaluation: PACU  Patient participation: complete - patient participated  Level of consciousness: sleepy but conscious  Pain score: 2  Pain management: adequate  Multimodal analgesia pain management approach  Airway patency: patent  Two or more strategies used to mitigate risk of obstructive sleep apnea  Cardiovascular status: blood pressure returned to baseline and acceptable  Respiratory status: acceptable  Hydration status: acceptable  Postoperative Nausea and Vomiting: none        No notable events documented.

## 2025-03-24 NOTE — DISCHARGE INSTRUCTIONS
Melanoma Surgery Discharge Instructions    Diet  No diet restrictions    Pain control  For baseline pain control: Tylenol (acetaminophen) 1,000 mg every 8 hours for one week  For mild pain: ibuprofen 600 mg every 8 hours with food as needed  For moderate to severe pain: Tramadol as prescribed    Activity  No specific lifting or activity restrictions  In general, listen to your body and do not overly stress your surgical sites    Incisions  Your incisions are covered with skin glue. This is a sterile dressing placed in the operating room. Do not pick or peel it off. This will fall off in 2-3 weeks.  No dressing changes or wound care is needed. Do not use any ointments or cleaning agents.  You have multiple layers of sutures under the skin that will dissolve.   If you have sutures through the skin they will be removed at your postoperative appointment in 3 weeks.  You may shower the day after surgery. Let soap and water wash over the incision and pat it dry.   No going underwater (bath, pool, lake, ocean, etc.) for 2 weeks.  If you notice any of the following, please call Ankita Thomas ([phone]) or Dr. Serrano's office ([phone]).  Swelling under the incision like a golf ball or larger.  Redness of the skin that is spreading away from the incision.  Drainage from the incision.  Fevers, chills, or any other concerning symptoms.    Follow-up  Call Rasheeda Torrez at [phone] to arrange a postop visit in 2-3 weeks  If you have sutures through your skin, these should be removed at 3 weeks  Dr. Serrano will discuss your pathology and Tumor Board recommendations at your postop visit. You may see your pathology online prior the visit. Write down any questions you have and bring them to your postop visit.    General Anesthesia Discharge Instructions    About this topic  You may need general anesthesia if you need to be asleep during a procedure. Your doctor will use drugs to block the signals that go from your nerves to  your brain. Doctors give general anesthesia during a surgery or procedure to:  Allow you to sleep  Help your body be still  Relax your muscles  Help you to relax and be pain free  Keep you from remembering the surgery  Let the doctor manage your airway, breathing, and blood flow  The doctor or nurse anesthetist gives general anesthesia by a shot into your vein. Sometimes, you may breathe in a gas through a mask placed over your face.  What care is needed at home?  Ask your doctor what you need to do when you go home. Make sure you ask questions if you do not understand what the doctor says.  Your doctor may give you drugs to prevent or treat an upset stomach from the anesthetic. Take them as ordered.  If your throat is sore, suck on ice chips or popsicles to ease throat pain.  Put 2 to 3 pillows under your head and back when you lie down to help you breathe easier.  For the first 24 to 48 hours:  Do not operate heavy or dangerous machinery.  Do not make major decisions or sign important papers. You may not be able to think clearly.  Avoid beer, wine, or mixed drinks.  You are at a higher risk of falling for at least 24 hours after general anesthesia.  Take extra care when you get up.  Do not change positions quickly.  Do not rush when you need to go to the bathroom or to answer the phone.  Ask for help if you feel unsteady when you try to walk.  Wear shoes with non-slip soles and low heels.  What follow-up care is needed?  Your doctor may ask you to come back to the office to check on your progress. Be sure to keep these visits.  If you have stitches that do not dissolve or staples, you will need to have them removed. Your doctor will want to do this in 1 to 2 weeks. If the doctor used skin glue, the glue will fall off on its own.  What drugs may be needed?  The doctor may order drugs to:  Help with pain  Treat an upset stomach or throwing up  Will physical activity be limited?  You will not be allowed to drive  right away after the procedure. Ask a family member or a friend to drive you home.  Avoid trying to get out of bed without help until you are sure of your balance.  You may have to limit your activity. Talk to your doctor about if you need to limit how much you lift or limit exercise after your procedure.  What changes to diet are needed?  Start with a light diet when you are fully awake. This includes things that are easy to swallow like soups, pudding, jello, toast, and eggs. Slowly progress to your normal diet.  What problems could happen?  Low blood pressure  Breathing problems  Upset stomach or throwing up  Dizziness  Blood clots  Infection  When do I need to call the doctor?  Trouble breathing  Upset stomach or throwing up more than 3 times in the next 2 days  Dizziness  Teach Back: Helping You Understand  The Teach Back Method helps you understand the information we are giving you. After you talk with the staff, tell them in your own words what you learned. This helps to make sure the staff has described each thing clearly. It also helps to explain things that may have been confusing. Before going home, make sure you can do these:  I can tell you about my procedure.  I can tell you if I need to follow up with my doctor.  I can tell you what is good for me to eat and drink the next day.  I can tell you what I would do if I have trouble breathing, an upset stomach, or dizziness.  Where can I learn more?  [employer]  https://www.nigms.nih.gov/education/pages/factsheet_Anesthesia.aspx  NHS Choices  http://www.nhs.uk/conditions/Anaesthetic-general/Pages/Definition.aspx  Last Reviewed Date  2020-04-22

## 2025-03-24 NOTE — ANESTHESIA PROCEDURE NOTES
Airway  Date/Time: 3/24/2025 9:34 AM  Urgency: elective    Airway not difficult    Staffing  Performed: attending   Authorized by: Caroline Alexandra MD    Performed by: Caroline Alexandra MD  Patient location during procedure: OR    Indications and Patient Condition  Indications for airway management: anesthesia and airway protection  Spontaneous ventilation: present  Sedation level: deep  Preoxygenated: yes  Patient position: sniffing  Mask difficulty assessment: 1 - vent by mask    Final Airway Details  Final airway type: endotracheal airway      Successful airway: ETT  Cuffed: yes   Successful intubation technique: video laryngoscopy  Facilitating devices/methods: intubating stylet  Endotracheal tube insertion site: oral  Blade: Michelle ([place])  Blade size: #3  ETT size (mm): 7.0  Cormack-Lehane Classification: grade I - full view of glottis  Placement verified by: chest auscultation and capnometry   Measured from: teeth  ETT to teeth (cm): 22  Number of attempts at approach: 1

## 2025-03-24 NOTE — ANESTHESIA PREPROCEDURE EVALUATION
Patient: Melisa Oseguera    Procedure Information       Date/Time: 03/24/25 0845    Procedures:       Excision Lesion Back - [facility] @ [facility], 730A      [place] Lymph Node Biopsy    Location: [facility] / [facility]    Surgeons: Tawanda Serrano MD            Relevant Problems   Cardiac  Stress test (2019): Normal exercise Myoview cardiac perfusion stress test.  No evidence of ischemia or myocardial infarction by perfusion imaging.  Normal left ventricular systolic function, ejection fraction 84%.  No exercise provoked significant ischemic ECG changes or chest pain  symptoms.     (+) Abnormal ECG   (+) Chest pain   (+) Hyperlipidemia   (+) Hypertension      Pulmonary   (+) MILTON (obstructive sleep apnea)   (+) SOBOE (shortness of breath on exertion)      Endocrine   (+) Controlled diabetes mellitus (Multi)      HEENT   (+) Hearing loss      ID   (+) Nail fungus      Hematology and Neoplasia   (+) Malignant melanoma of upper back (Multi)       Clinical information reviewed:   Tobacco  Allergies  Meds   Med Hx  Surg Hx   Fam Hx  Soc Hx        NPO Detail:  NPO/Void Status  Carbohydrate Drink Given Prior to Surgery? : N  Date of Last Liquid: 03/23/25  Time of Last Liquid: 2100  Date of Last Solid: 03/23/25  Time of Last Solid: 1830  Last Intake Type: Clear fluids  Time of Last Void: 0600         Physical Exam    Airway  Mallampati: II  TM distance: >3 FB  Neck ROM: full     Cardiovascular   Rhythm: regular  Rate: normal     Dental - normal exam     Pulmonary - normal exam     Abdominal - normal exam             Anesthesia Plan    History of general anesthesia?: yes  History of complications of general anesthesia?: no    ASA 2     general   (GETA)  The patient is not a current smoker.  Education provided regarding risk of obstructive sleep apnea.  intravenous induction   Anesthetic plan and risks discussed with patient.    Plan discussed with attending.

## 2025-03-31 LAB
ATRIAL RATE: 66 BPM
P AXIS: 57 DEGREES
P OFFSET: 198 MS
P ONSET: 140 MS
PR INTERVAL: 154 MS
Q ONSET: 217 MS
QRS COUNT: 11 BEATS
QRS DURATION: 98 MS
QT INTERVAL: 390 MS
QTC CALCULATION(BAZETT): 408 MS
QTC FREDERICIA: 403 MS
R AXIS: 50 DEGREES
T AXIS: 35 DEGREES
T OFFSET: 412 MS
VENTRICULAR RATE: 66 BPM

## 2025-04-07 NOTE — PROGRESS NOTES
ASSESSMENT AND PLAN  Melisa Oseguera is a 74 y.o. female who is now s/p wide local excision and sentinel lymph node biopsy on 3/24/25. On final pathology her nodes were benign and margins clear, pT1bN0, stage 1A. We discussed that this is great news and does not require further workup or follow-up with us at this time. We advised dermatology follow-up for regular exams and to return to our clinic with any concerns in the future. The patient expressed understanding.       Tawanda Serrano MD  Assistant Professor of Surgery  Division of Surgical Oncology  [phone]  [email or web address]        SUBJECTIVE  Melisa Oseguera is a 74 y.o. female who presents for a postoperative clinic visit.  Referring provider: Krista Ricketts  Diagnosis: melanoma  Location: right upper back  Thickness: at least 0.8 mm  Surgery: Wide local excision and sentinel lymph node biopsy on 3/24/25  Postoperative issues: none      Physical exam  Incisions are clean, dry, and intact    Surgical Pathology  FINAL DIAGNOSIS   A: SKIN, WIDE LOCAL EXCISION RIGHT UPPER BACK, SHORT STITCH SUPERIOR, LONG STITCH LATERAL, WIDE EXCISION:  INVASIVE MALIGNANT MELANOMA, EXTENDING TO A DEPTH OF 0.9 MM, AND REACTIVE CHANGES, CONSISTENT WITH PRIOR PROCEDURE SITE, INKED MARGINS FREE IN THESE PLANES OF SECTION (SEE COMMENT)     Comment: Residual melanoma in situ and/or invasive melanoma is present in blocks A12 through A20.  Prior procedure site changes are also present.  Underlying areas of invasive melanoma, there are bland nested melanocytes that dispersed and mature with descent suggesting a precursor dermal melanocytic nevus. There is an incidental neurofibroma in block A4. Findings from the original biopsy [id number] will be used in conjunction with the current case for staging purposes. An ulceration was noted on the original biopsy, but is not identified in the current sections. See synoptic summary below     B: NODE, SENTINEL LYMPH NODE # 1 RIGHT  AXILLA, SENTINEL LYMPH NODE EXCISION:  BENIGN LYMPH NODE (SEE COMMENT)     Comment: Immunostains for HMB-45, Melan-A, and SOX-10 (controls appropriate) are negative for definitive malignancy on blocks B1 and B2.      C: NODE, SENTINEL LYMPH NODE # 2 RIGHT AXILLA, SENTINEL LYMPH NODE EXCISION:  BENIGN LYMPH NODE (SEE COMMENT)     Comment: Immunostains for HMB-45, Melan-A, and SOX-10 (controls appropriate) are negative for definitive malignancy on blocks C1 through C3.     **Electronically signed out by MAGGIE REYES MD**    Electronically signed by Maggie Reyes MD on 4/8/2025 at 1150        By the signature on this report, the individual or group listed as making the Final Interpretation/Diagnosis certifies that they have reviewed this case.    Clinical History    Pre-Op Diagnosis: Malignant melanoma of upper back (Multi)  Post-Op Diagnosis: Malignant melanoma of upper back (Multi)  Admission Diagnosis: Malignant melanoma of upper back (Multi) C43.59     Specimen ID: [id number], [id number]  Specimen Source: SKIN WIDE EXCISION  Site/Location: WIDE LOCAL EXCISION RIGHT UPPER BACK, SHORT STITCH SUPERIOR, LONG STITCH LATERAL  Collection Comments: 3 X 11CM     Specimen ID: [id number], [id number]  Specimen Source: SENTINEL LYMPH NODE MELANOMA  Site/Location: SENTINEL LYMPH NODE # 1 RIGHT AXILLA  Collection Comments: NEOPROBE COUNT 1340     Specimen ID: [id number], [id number]  Specimen Source: SENTINEL LYMPH NODE MELANOMA  Site/Location: SENTINEL LYMPH NODE # 2 RIGHT AXILLA  Collection Comments: NEOPROBE COUNT 256   Microscopic Description    Microscopic examination performed. Deeper additional sections fail to reveal additional findings on blocks A2, A3, A6, A8, A9, A14, A16, A17, A18, A19, A27. SOX-10 (control apporpriate) on A14 confirms confluence and poor nesting.    Disclaimer    One or more of the reagents used to perform assays on this specimen MAY have contained components considered to be analyte specific  "reagents (ASR's).  ASR's have not been cleared or approved by the U.S. Food and Drug Administration.  These assays were developed and their performance characteristics determined by the Department of Pathology at [facility]. The FDA does not require this test to go through premarket FDA review. This test is used for clinical purposes. It should not be regarded as investigational or for research. This laboratory is certified under the Clinical Laboratory Improvement Amendments (CLIA) as qualified to perform high complexity clinical laboratory testing.  The assays were performed with appropriate positive and negative controls which stained appropriately.   Gross Description    A:  Received in formalin is a tan, ellipsoid piece of skin measuring 110 x 30 x 20 mm. It was received with a short stitch designated by the surgeon as \"superior\", referred to here as 12 o'clock for the purpose of gross description. It was received with a second long stitch designated by the surgeon as \"lateral\", referred to here as 3 o'clock for the purpose of gross description. The specimen was inked blue on the 9-3 o'clock margin and inked black on the 3-9 o'clock margin, then serially sectioned and embedded in toto in twenty-nine blocks. Block 1 is at 3 o'clock, block 16 is at 12 and 6 o'clock, and block 29 is at 9 o'clock. The tips are embedded in blocks 1 and 29.    B:  Received in formalin is a yellow, irregularly shaped lymph node measuring  25 x 8 x 8 mm.  It was embedded in toto in two blocks.     C:  Received in formalin is a yellow, irregularly shaped lymph node measuring  30 x 15 x 10 mm.  It was embedded in toto in three blocks.     The specimen was grossed by Isha Matthews.          Case Summary Report   MELANOMA OF THE SKIN: Excision, Re-Excision   8th Edition - Protocol posted: 11/10/2021MELANOMA OF THE SKIN: EXCISION, RE-EXCISION  - All Specimens  SPECIMEN   Procedure  Excision     [place] " node(s) biopsy   Specimen Laterality  Right   TUMOR   Tumor Site  Skin of trunk: right upper back        Histologic Type  Superficial spreading melanoma (low-cumulative sun damage (CSD) melanoma)   Maximum Tumor ([place]) Thickness (Millimeters)  0.9 mm   Macroscopic Satellite Nodule(s)  Cannot be determined: clinical information not given   Ulceration  Present   Extent of Ulceration (Millimeters)  1 mm   Anatomic ([place]) Level  IV (Melanoma invades reticular dermis)   Mitotic Rate  1 mitoses per mm2   Microsatellite(s)  Not identified   Lymphovascular Invasion  Present   Neurotropism  Not identified   Tumor-Infiltrating Lymphocytes  Present, nonbrisk   MARGINS     Margin Status for Invasive Melanoma  All margins negative for invasive melanoma   Distance from Invasive Melanoma to Peripheral Margin  At least: 9 mm   Margin Status for Melanoma in situ  All margins negative for melanoma in situ   Distance from Melanoma in Situ to Peripheral Margin  At least: 4 mm   REGIONAL LYMPH NODES     Regional Lymph Node Status  All regional lymph nodes negative for tumor   Total Number of Lymph Nodes Examined  2   Number of Shock Nodes Examined  2   PATHOLOGIC STAGE CLASSIFICATION (pTNM, AJCC 8th Edition)     pT Category  pT1b   pN Category  pN0   Comment(s)  residual melanoma in blocks A12 through A20

## 2025-04-08 ENCOUNTER — OFFICE VISIT (OUTPATIENT)
Dept: SURGICAL ONCOLOGY | Facility: CLINIC | Age: 75
End: 2025-04-08
Payer: MEDICARE

## 2025-04-08 VITALS
BODY MASS INDEX: 28.41 KG/M2 | SYSTOLIC BLOOD PRESSURE: 130 MMHG | TEMPERATURE: 98.1 F | RESPIRATION RATE: 18 BRPM | HEART RATE: 70 BPM | OXYGEN SATURATION: 96 % | DIASTOLIC BLOOD PRESSURE: 77 MMHG | WEIGHT: 165.5 LBS

## 2025-04-08 DIAGNOSIS — C43.59 MALIGNANT MELANOMA OF UPPER BACK (MULTI): Primary | ICD-10-CM

## 2025-04-08 LAB
LAB AP ASR DISCLAIMER: NORMAL
LABORATORY COMMENT REPORT: NORMAL
PATH REPORT.FINAL DX SPEC: NORMAL
PATH REPORT.GROSS SPEC: NORMAL
PATH REPORT.MICROSCOPIC SPEC OTHER STN: NORMAL
PATH REPORT.RELEVANT HX SPEC: NORMAL
PATH REPORT.TOTAL CANCER: NORMAL
PATHOLOGY SYNOPTIC REPORT: NORMAL

## 2025-04-08 PROCEDURE — 4010F ACE/ARB THERAPY RXD/TAKEN: CPT | Performed by: STUDENT IN AN ORGANIZED HEALTH CARE EDUCATION/TRAINING PROGRAM

## 2025-04-08 PROCEDURE — 3044F HG A1C LEVEL LT 7.0%: CPT | Performed by: STUDENT IN AN ORGANIZED HEALTH CARE EDUCATION/TRAINING PROGRAM

## 2025-04-08 PROCEDURE — 3075F SYST BP GE 130 - 139MM HG: CPT | Performed by: STUDENT IN AN ORGANIZED HEALTH CARE EDUCATION/TRAINING PROGRAM

## 2025-04-08 PROCEDURE — 1036F TOBACCO NON-USER: CPT | Performed by: STUDENT IN AN ORGANIZED HEALTH CARE EDUCATION/TRAINING PROGRAM

## 2025-04-08 PROCEDURE — 99211 OFF/OP EST MAY X REQ PHY/QHP: CPT | Performed by: STUDENT IN AN ORGANIZED HEALTH CARE EDUCATION/TRAINING PROGRAM

## 2025-04-08 PROCEDURE — 1159F MED LIST DOCD IN RCRD: CPT | Performed by: STUDENT IN AN ORGANIZED HEALTH CARE EDUCATION/TRAINING PROGRAM

## 2025-04-08 PROCEDURE — 3050F LDL-C >= 130 MG/DL: CPT | Performed by: STUDENT IN AN ORGANIZED HEALTH CARE EDUCATION/TRAINING PROGRAM

## 2025-04-08 PROCEDURE — 1123F ACP DISCUSS/DSCN MKR DOCD: CPT | Performed by: STUDENT IN AN ORGANIZED HEALTH CARE EDUCATION/TRAINING PROGRAM

## 2025-04-08 PROCEDURE — 3078F DIAST BP <80 MM HG: CPT | Performed by: STUDENT IN AN ORGANIZED HEALTH CARE EDUCATION/TRAINING PROGRAM

## 2025-04-08 PROCEDURE — 1126F AMNT PAIN NOTED NONE PRSNT: CPT | Performed by: STUDENT IN AN ORGANIZED HEALTH CARE EDUCATION/TRAINING PROGRAM

## 2025-04-08 ASSESSMENT — PAIN SCALES - GENERAL: PAINLEVEL_OUTOF10: 0-NO PAIN

## 2025-04-08 NOTE — LETTER
April 8, 2025     Krista Ricketts MD  [address] [facility] [address]  [facility] [address]    Patient: Melisa Oseguera   YOB: 1950   Date of Visit: 4/8/2025       Dear Dr. Krista Ricketts MD:    Thank you for referring Melisa Oseguera to me for evaluation. Below are my notes for this consultation.  If you have questions, please do not hesitate to call me. I look forward to following your patient along with you.       Sincerely,     Tawanda Serrano MD      CC: No Recipients  ______________________________________________________________________________________    ASSESSMENT AND PLAN  Melisa Oseguera is a 74 y.o. female who is now s/p wide local excision and sentinel lymph node biopsy on 3/24/25. On final pathology her nodes were benign and margins clear, pT1bN0, stage 1A. We discussed that this is great news and does not require further workup or follow-up with us at this time. We advised dermatology follow-up for regular exams and to return to our clinic with any concerns in the future. The patient expressed understanding.       Tawanda Serrano MD  Assistant Professor of Surgery  Division of Surgical Oncology  [phone]  [email or web address]        SUBJECTIVE  Melisa Oseguera is a 74 y.o. female who presents for a postoperative clinic visit.  Referring provider: Krista Ricketts  Diagnosis: melanoma  Location: right upper back  Thickness: at least 0.8 mm  Surgery: Wide local excision and sentinel lymph node biopsy on 3/24/25  Postoperative issues: none      Physical exam  Incisions are clean, dry, and intact    Surgical Pathology  FINAL DIAGNOSIS   A: SKIN, WIDE LOCAL EXCISION RIGHT UPPER BACK, SHORT STITCH SUPERIOR, LONG STITCH LATERAL, WIDE EXCISION:  INVASIVE MALIGNANT MELANOMA, EXTENDING TO A DEPTH OF 0.9 MM, AND REACTIVE CHANGES, CONSISTENT WITH PRIOR PROCEDURE SITE, INKED MARGINS FREE IN THESE PLANES OF SECTION (SEE COMMENT)     Comment: Residual melanoma in situ and/or invasive melanoma  is present in blocks A12 through A20.  Prior procedure site changes are also present.  Underlying areas of invasive melanoma, there are bland nested melanocytes that dispersed and mature with descent suggesting a precursor dermal melanocytic nevus. There is an incidental neurofibroma in block A4. Findings from the original biopsy [id number] will be used in conjunction with the current case for staging purposes. An ulceration was noted on the original biopsy, but is not identified in the current sections. See synoptic summary below     B: NODE, SENTINEL LYMPH NODE # 1 RIGHT AXILLA, SENTINEL LYMPH NODE EXCISION:  BENIGN LYMPH NODE (SEE COMMENT)     Comment: Immunostains for HMB-45, Melan-A, and SOX-10 (controls appropriate) are negative for definitive malignancy on blocks B1 and B2.      C: NODE, SENTINEL LYMPH NODE # 2 RIGHT AXILLA, SENTINEL LYMPH NODE EXCISION:  BENIGN LYMPH NODE (SEE COMMENT)     Comment: Immunostains for HMB-45, Melan-A, and SOX-10 (controls appropriate) are negative for definitive malignancy on blocks C1 through C3.     **Electronically signed out by MAGGIE REYES MD**    Electronically signed by Maggie Reyes MD on 4/8/2025 at 1150        By the signature on this report, the individual or group listed as making the Final Interpretation/Diagnosis certifies that they have reviewed this case.    Clinical History    Pre-Op Diagnosis: Malignant melanoma of upper back (Multi)  Post-Op Diagnosis: Malignant melanoma of upper back (Multi)  Admission Diagnosis: Malignant melanoma of upper back (Multi) C43.59     Specimen ID: [id number], [id number]  Specimen Source: SKIN WIDE EXCISION  Site/Location: WIDE LOCAL EXCISION RIGHT UPPER BACK, SHORT STITCH SUPERIOR, LONG STITCH LATERAL  Collection Comments: 3 X 11CM     Specimen ID: [id number], [id number]  Specimen Source: SENTINEL LYMPH NODE MELANOMA  Site/Location: SENTINEL LYMPH NODE # 1 RIGHT AXILLA  Collection Comments: NEOPROBE COUNT 1340     Specimen  "ID: [id number] , [id number]  Specimen Source: SENTINEL LYMPH NODE MELANOMA  Site/Location: SENTINEL LYMPH NODE # 2 RIGHT AXILLA  Collection Comments: NEOPROBE COUNT 256   Microscopic Description    Microscopic examination performed. Deeper additional sections fail to reveal additional findings on blocks A2, A3, A6, A8, A9, A14, A16, A17, A18, A19, A27. SOX-10 (control apporpriate) on A14 confirms confluence and poor nesting.    Disclaimer    One or more of the reagents used to perform assays on this specimen MAY have contained components considered to be analyte specific reagents (ASR's).  ASR's have not been cleared or approved by the U.S. Food and Drug Administration.  These assays were developed and their performance characteristics determined by the Department of Pathology at [facility]. The FDA does not require this test to go through premarket FDA review. This test is used for clinical purposes. It should not be regarded as investigational or for research. This laboratory is certified under the Clinical Laboratory Improvement Amendments (CLIA) as qualified to perform high complexity clinical laboratory testing.  The assays were performed with appropriate positive and negative controls which stained appropriately.   Gross Description    A:  Received in formalin is a tan, ellipsoid piece of skin measuring 110 x 30 x 20 mm. It was received with a short stitch designated by the surgeon as \"superior\", referred to here as 12 o'clock for the purpose of gross description. It was received with a second long stitch designated by the surgeon as \"lateral\", referred to here as 3 o'clock for the purpose of gross description. The specimen was inked blue on the 9-3 o'clock margin and inked black on the 3-9 o'clock margin, then serially sectioned and embedded in toto in twenty-nine blocks. Block 1 is at 3 o'clock, block 16 is at 12 and 6 o'clock, and block 29 is at 9 o'clock. The tips are " embedded in blocks 1 and 29.    B:  Received in formalin is a yellow, irregularly shaped lymph node measuring  25 x 8 x 8 mm.  It was embedded in toto in two blocks.     C:  Received in formalin is a yellow, irregularly shaped lymph node measuring  30 x 15 x 10 mm.  It was embedded in toto in three blocks.     The specimen was grossed by Isha Matthews.          Case Summary Report   MELANOMA OF THE SKIN: Excision, Re-Excision   8th Edition - Protocol posted: 11/10/2021MELANOMA OF THE SKIN: EXCISION, RE-EXCISION  - All Specimens  SPECIMEN   Procedure  Excision     [place] node(s) biopsy   Specimen Laterality  Right   TUMOR   Tumor Site  Skin of trunk: right upper back        Histologic Type  Superficial spreading melanoma (low-cumulative sun damage (CSD) melanoma)   Maximum Tumor (Breslow) Thickness (Millimeters)  0.9 mm   Macroscopic Satellite Nodule(s)  Cannot be determined: clinical information not given   Ulceration  Present   Extent of Ulceration (Millimeters)  1 mm   Anatomic (Britton) Level  IV (Melanoma invades reticular dermis)   Mitotic Rate  1 mitoses per mm2   Microsatellite(s)  Not identified   Lymphovascular Invasion  Present   Neurotropism  Not identified   Tumor-Infiltrating Lymphocytes  Present, nonbrisk   MARGINS     Margin Status for Invasive Melanoma  All margins negative for invasive melanoma   Distance from Invasive Melanoma to Peripheral Margin  At least: 9 mm   Margin Status for Melanoma in situ  All margins negative for melanoma in situ   Distance from Melanoma in Situ to Peripheral Margin  At least: 4 mm   REGIONAL LYMPH NODES     Regional Lymph Node Status  All regional lymph nodes negative for tumor   Total Number of Lymph Nodes Examined  2   Number of [place] Nodes Examined  2   PATHOLOGIC STAGE CLASSIFICATION (pTNM, AJCC 8th Edition)     pT Category  pT1b   pN Category  pN0   Comment(s)  residual melanoma in blocks A12 through A20

## 2025-04-09 DIAGNOSIS — C43.59 MALIGNANT MELANOMA OF UPPER BACK (MULTI): Primary | ICD-10-CM

## 2025-04-11 NOTE — TUMOR BOARD NOTE
General Patient Information  Name:  Melisa Oseguera  Evaluation #:  2  Conference Date:  4/14/2025  YOB: 1950  MRN:  [id number]  Program Physician(s):  Josef Acosta  Referring Physician(s):  Krista Hernandez      Summary   Stage:  Esteban ([id number]) ; Melanoma 5 year survival: 99%    Assessment:  Right superior upper back with an ulcerated superficial spreading melanoma, Breslow of 0.8 mm, present on the deep and peripheral margin    S/p WLE with 1 cm margins and SLNB, showing invasive malignant melanoma extending to depth of 0.9 mm with inked margins free. Two sentinel nodes in right axilla examined and found to be benign (0/2). Adequately surgically treated.    Recommendation:  Annual H&P.    Review Multidisciplinary Cutaneous Oncology Conference recommendation with patient.  Continue routine follow up and total body skin exams with Krista Ricketts.    Follow Up:  Krista Hernandez      History and Physical Exam  Dermatologic History:   74 y.o. female with a biopsy of the right superior upper back on 2/5/2025 showing an ulcerated melanoma, superficial spreading type, Breslow: 0.8 mm, present on the deep and peripheral margins.    S/p WLE with 1 cm margins and SLNB on 3/24/25, showing invasive malignant melanoma extending to depth of 0.9 mm with inked margins free. Two sentinel nodes in right axilla examined and found to be benign (0/2). Adequately surgically treated.    Past Medical History:    Past Medical History:   Diagnosis Date    CPAP (continuous positive airway pressure) dependence     Hypertension     Melanoma in situ of back (Multi)     Ocular migraine     Sleep apnea        Family History of DNS/MM:  Unknown    Skin:  Incisions are clean, dry, and intact     Lymphatic:  Not commented on.      Pathology    Dermatopathology- DERM LAB: [id number]   Collected 3/24/2025 10:05     Component    FINAL DIAGNOSIS   A: SKIN, WIDE LOCAL EXCISION RIGHT UPPER BACK, SHORT STITCH  SUPERIOR, LONG STITCH LATERAL, WIDE EXCISION:  INVASIVE MALIGNANT MELANOMA, EXTENDING TO A DEPTH OF 0.9 MM, AND REACTIVE CHANGES, CONSISTENT WITH PRIOR PROCEDURE SITE, INKED MARGINS FREE IN THESE PLANES OF SECTION (SEE COMMENT)     Comment: Residual melanoma in situ and/or invasive melanoma is present in blocks A12 through A20.  Prior procedure site changes are also present.  Underlying areas of invasive melanoma, there are bland nested melanocytes that dispersed and mature with descent suggesting a precursor dermal melanocytic nevus. There is an incidental neurofibroma in block A4. Findings from the original biopsy [id number] will be used in conjunction with the current case for staging purposes. An ulceration was noted on the original biopsy, but is not identified in the current sections. See synoptic summary below     B: NODE, SENTINEL LYMPH NODE # 1 RIGHT AXILLA, SENTINEL LYMPH NODE EXCISION:  BENIGN LYMPH NODE (SEE COMMENT)     Comment: Immunostains for HMB-45, Melan-A, and SOX-10 (controls appropriate) are negative for definitive malignancy on blocks B1 and B2.      C: NODE, SENTINEL LYMPH NODE # 2 RIGHT AXILLA, SENTINEL LYMPH NODE EXCISION:  BENIGN LYMPH NODE (SEE COMMENT)     Comment: Immunostains for HMB-45, Melan-A, and SOX-10 (controls appropriate) are negative for definitive malignancy on blocks C1 through C3.     **Electronically signed out by MAGGIE REYES MD**    Electronically signed by Maggie Reyes MD on 4/8/2025 at 1150     SPECIMEN   Procedure  Excision     [place] node(s) biopsy   Specimen Laterality  Right   TUMOR   Tumor Site  Skin of trunk: right upper back        Histologic Type  Superficial spreading melanoma (low-cumulative sun damage (CSD) melanoma)   Maximum Tumor (Breslow) Thickness (Millimeters)  0.9 mm   Macroscopic Satellite Nodule(s)  Cannot be determined: clinical information not given   Ulceration  Present   Extent of Ulceration (Millimeters)  1 mm   Anatomic (Britton) Level  IV  (Melanoma invades reticular dermis)   Mitotic Rate  1 mitoses per mm2   Microsatellite(s)  Not identified   Lymphovascular Invasion  Present   Neurotropism  Not identified   Tumor-Infiltrating Lymphocytes  Present, nonbrisk   MARGINS     Margin Status for Invasive Melanoma  All margins negative for invasive melanoma   Distance from Invasive Melanoma to Peripheral Margin  At least: 9 mm   Margin Status for Melanoma in situ  All margins negative for melanoma in situ   Distance from Melanoma in Situ to Peripheral Margin  At least: 4 mm   REGIONAL LYMPH NODES     Regional Lymph Node Status  All regional lymph nodes negative for tumor   Total Number of Lymph Nodes Examined  2   Number of [place] Nodes Examined  2   PATHOLOGIC STAGE CLASSIFICATION (pTNM, AJCC 8th Edition)     pT Category  pT1b   pN Category  pN0   Comment(s)  residual melanoma in blocks A12 through A20     _______________________________________________________________________________  Dermpath Consult: [id number]     7 SLIDES, [facility], #[id number] (BX: 02/05/2025)     A. SKIN, RIGHT SUPERIOR UPPER BACK, SHAVE BIOPSY:  ULCERATED MALIGNANT MELANOMA, BRESLOW THICKNESS 0.8 MM, PRESENT ON THE DEEP AND PERIPHERAL MARGIN AND DERMAL NEVUS, PRESENT ON THE DEEP MARGIN, SEE NOTE.     Note: Microscopic examination reveals a specimen that extends into the dermis. There is an area of ulceration and there is an asymmetric proliferation of nested and single melanocytes throughout all layers of the epidermis. There are nests of benign-appearing melanocytes in the dermis and nests of atypical melanocytes. The atypical melanocytes in the epidermis and dermis have mildly enlarged nuclei with moderate cytoplasm. They stain with antibodies against Melan-A, SOX-10 and PRAME. The benign-appearing melanocytes in the dermis do not stain with antibodies against PRAME. A D2-40 stain was reviewed. On the H&E there is an area suspicious for  vascular invasion.      B. SKIN, LEFT MID-UPPER BACK, SHAVE BIOPSY:  HEMANGIOMA, PRESENT ON THE DEEP MARGIN.     ** Electronically signed out by Yanni Burch MD **    SPECIMEN   Procedure  Biopsy, shave   Specimen Laterality  Right   TUMOR   Tumor Site  Skin of trunk: Right superior upper back          Histologic Type  Superficial spreading melanoma (low-cumulative sun damage (CSD) melanoma)   Maximum Tumor (Breslow) Thickness (Millimeters)  0.8 mm   Ulceration  Present   Extent of Ulceration (Millimeters)  1 mm   Anatomic (Britton) Level  IV (melanoma invades reticular dermis)   Mitotic Rate  1 mitoses per mm2   Microsatellite(s)  Not identified   Lymphovascular Invasion  Present   Neurotropism  Not identified   Tumor-Infiltrating Lymphocytes  Present, nonbrisk   Tumor Regression  Not identified   MARGINS     Margin Status for Invasive Melanoma  Invasive melanoma present at margin   Margin(s) Involved by Invasive Melanoma  Peripheral   Margin Status for Melanoma in situ  Melanoma in situ present at margin   Margin(s) Involved by Melanoma in Situ  Peripheral     Deep   PATHOLOGIC STAGE CLASSIFICATION (pTNM, AJCC 8th Edition)     pT Category  pT1b       Radiology      Clinical Images  3/4/2025

## 2025-04-14 ENCOUNTER — TUMOR BOARD CONFERENCE (OUTPATIENT)
Dept: HEMATOLOGY/ONCOLOGY | Facility: HOSPITAL | Age: 75
End: 2025-04-14
Payer: MEDICARE

## 2025-08-13 LAB
25(OH)D3+25(OH)D2 SERPL-MCNC: 38 NG/ML (ref 30–100)
ALBUMIN SERPL-MCNC: 4.4 G/DL (ref 3.6–5.1)
ALBUMIN/CREAT UR: 13 MG/G CREAT
ALP SERPL-CCNC: 73 U/L (ref 37–153)
ALT SERPL-CCNC: 16 U/L (ref 6–29)
ANION GAP SERPL CALCULATED.4IONS-SCNC: 7 MMOL/L (CALC) (ref 7–17)
AST SERPL-CCNC: 14 U/L (ref 10–35)
BASOPHILS # BLD AUTO: 58 CELLS/UL (ref 0–200)
BASOPHILS NFR BLD AUTO: 1.2 %
BILIRUB SERPL-MCNC: 0.5 MG/DL (ref 0.2–1.2)
BUN SERPL-MCNC: 13 MG/DL (ref 7–25)
CALCIUM SERPL-MCNC: 10 MG/DL (ref 8.6–10.4)
CHLORIDE SERPL-SCNC: 104 MMOL/L (ref 98–110)
CHOLEST SERPL-MCNC: 255 MG/DL
CHOLEST/HDLC SERPL: 4.2 (CALC)
CO2 SERPL-SCNC: 29 MMOL/L (ref 20–32)
CREAT SERPL-MCNC: 0.63 MG/DL (ref 0.6–1)
CREAT UR-MCNC: 120 MG/DL (ref 20–275)
EGFRCR SERPLBLD CKD-EPI 2021: 93 ML/MIN/1.73M2
EOSINOPHIL # BLD AUTO: 250 CELLS/UL (ref 15–500)
EOSINOPHIL NFR BLD AUTO: 5.2 %
ERYTHROCYTE [DISTWIDTH] IN BLOOD BY AUTOMATED COUNT: 12.9 % (ref 11–15)
EST. AVERAGE GLUCOSE BLD GHB EST-MCNC: 140 MG/DL
EST. AVERAGE GLUCOSE BLD GHB EST-SCNC: 7.7 MMOL/L
GLUCOSE SERPL-MCNC: 139 MG/DL (ref 65–99)
HBA1C MFR BLD: 6.5 %
HCT VFR BLD AUTO: 40 % (ref 35–45)
HDLC SERPL-MCNC: 61 MG/DL
HGB BLD-MCNC: 13.4 G/DL (ref 11.7–15.5)
LDLC SERPL CALC-MCNC: 163 MG/DL (CALC)
LYMPHOCYTES # BLD AUTO: 1392 CELLS/UL (ref 850–3900)
LYMPHOCYTES NFR BLD AUTO: 29 %
MCH RBC QN AUTO: 32.4 PG (ref 27–33)
MCHC RBC AUTO-ENTMCNC: 33.5 G/DL (ref 32–36)
MCV RBC AUTO: 96.6 FL (ref 80–100)
MICROALBUMIN UR-MCNC: 1.5 MG/DL
MONOCYTES # BLD AUTO: 336 CELLS/UL (ref 200–950)
MONOCYTES NFR BLD AUTO: 7 %
NEUTROPHILS # BLD AUTO: 2765 CELLS/UL (ref 1500–7800)
NEUTROPHILS NFR BLD AUTO: 57.6 %
NONHDLC SERPL-MCNC: 194 MG/DL (CALC)
PLATELET # BLD AUTO: 193 THOUSAND/UL (ref 140–400)
PMV BLD REES-ECKER: 10.4 FL (ref 7.5–12.5)
POTASSIUM SERPL-SCNC: 5.4 MMOL/L (ref 3.5–5.3)
PROT SERPL-MCNC: 6.7 G/DL (ref 6.1–8.1)
RBC # BLD AUTO: 4.14 MILLION/UL (ref 3.8–5.1)
SODIUM SERPL-SCNC: 140 MMOL/L (ref 135–146)
TRIGL SERPL-MCNC: 159 MG/DL
WBC # BLD AUTO: 4.8 THOUSAND/UL (ref 3.8–10.8)

## 2025-08-14 ENCOUNTER — RESULTS FOLLOW-UP (OUTPATIENT)
Dept: PRIMARY CARE | Facility: CLINIC | Age: 75
End: 2025-08-14
Payer: MEDICARE

## 2025-08-18 ENCOUNTER — APPOINTMENT (OUTPATIENT)
Dept: PRIMARY CARE | Facility: CLINIC | Age: 75
End: 2025-08-18
Payer: MEDICARE

## 2025-08-18 VITALS
HEIGHT: 64 IN | HEART RATE: 53 BPM | WEIGHT: 161.6 LBS | TEMPERATURE: 97.2 F | SYSTOLIC BLOOD PRESSURE: 122 MMHG | BODY MASS INDEX: 27.59 KG/M2 | DIASTOLIC BLOOD PRESSURE: 75 MMHG

## 2025-08-18 DIAGNOSIS — E11.9 CONTROLLED TYPE 2 DIABETES MELLITUS WITHOUT COMPLICATION, WITHOUT LONG-TERM CURRENT USE OF INSULIN: ICD-10-CM

## 2025-08-18 DIAGNOSIS — E78.2 MIXED HYPERLIPIDEMIA: ICD-10-CM

## 2025-08-18 DIAGNOSIS — I10 PRIMARY HYPERTENSION: ICD-10-CM

## 2025-08-18 DIAGNOSIS — Z00.00 ROUTINE GENERAL MEDICAL EXAMINATION AT HEALTH CARE FACILITY: ICD-10-CM

## 2025-08-18 DIAGNOSIS — C43.59 MALIGNANT MELANOMA OF UPPER BACK (MULTI): ICD-10-CM

## 2025-08-18 DIAGNOSIS — M79.671 RIGHT FOOT PAIN: ICD-10-CM

## 2025-08-18 DIAGNOSIS — Z00.00 HEALTHCARE MAINTENANCE: Primary | ICD-10-CM

## 2025-08-18 DIAGNOSIS — I10 ESSENTIAL (PRIMARY) HYPERTENSION: ICD-10-CM

## 2025-08-18 PROCEDURE — 99214 OFFICE O/P EST MOD 30 MIN: CPT | Performed by: FAMILY MEDICINE

## 2025-08-18 PROCEDURE — 1160F RVW MEDS BY RX/DR IN RCRD: CPT | Performed by: FAMILY MEDICINE

## 2025-08-18 PROCEDURE — 3008F BODY MASS INDEX DOCD: CPT | Performed by: FAMILY MEDICINE

## 2025-08-18 PROCEDURE — G2211 COMPLEX E/M VISIT ADD ON: HCPCS | Performed by: FAMILY MEDICINE

## 2025-08-18 PROCEDURE — 3074F SYST BP LT 130 MM HG: CPT | Performed by: FAMILY MEDICINE

## 2025-08-18 PROCEDURE — 1170F FXNL STATUS ASSESSED: CPT | Performed by: FAMILY MEDICINE

## 2025-08-18 PROCEDURE — 3078F DIAST BP <80 MM HG: CPT | Performed by: FAMILY MEDICINE

## 2025-08-18 PROCEDURE — 4010F ACE/ARB THERAPY RXD/TAKEN: CPT | Performed by: FAMILY MEDICINE

## 2025-08-18 PROCEDURE — G0439 PPPS, SUBSEQ VISIT: HCPCS | Performed by: FAMILY MEDICINE

## 2025-08-18 PROCEDURE — 1158F ADVNC CARE PLAN TLK DOCD: CPT | Performed by: FAMILY MEDICINE

## 2025-08-18 PROCEDURE — 1159F MED LIST DOCD IN RCRD: CPT | Performed by: FAMILY MEDICINE

## 2025-08-18 RX ORDER — LISINOPRIL 10 MG/1
10 TABLET ORAL DAILY
Qty: 90 TABLET | Refills: 1 | Status: SHIPPED | OUTPATIENT
Start: 2025-08-18

## 2025-08-18 ASSESSMENT — ACTIVITIES OF DAILY LIVING (ADL)
BATHING: NEEDS ASSISTANCE
MANAGING_FINANCES: INDEPENDENT
GROCERY_SHOPPING: INDEPENDENT
TAKING_MEDICATION: INDEPENDENT
DOING_HOUSEWORK: INDEPENDENT
DRESSING: NEEDS ASSISTANCE

## 2025-08-18 ASSESSMENT — PATIENT HEALTH QUESTIONNAIRE - PHQ9
SUM OF ALL RESPONSES TO PHQ9 QUESTIONS 1 AND 2: 0
2. FEELING DOWN, DEPRESSED OR HOPELESS: NOT AT ALL
1. LITTLE INTEREST OR PLEASURE IN DOING THINGS: NOT AT ALL

## 2026-02-19 ENCOUNTER — APPOINTMENT (OUTPATIENT)
Dept: PRIMARY CARE | Facility: CLINIC | Age: 76
End: 2026-02-19
Payer: MEDICARE

## (undated) DEVICE — GLOVE, SURGICAL, PROTEXIS PI BLUE W/NEUTHERA, 8.0, PF, LF

## (undated) DEVICE — APPLICATOR, CHLORAPREP, W/ORANGE TINT, 26ML

## (undated) DEVICE — DRESSING, NON-ADHERENT, TELFA, OUCHLESS, 3 X 8 IN, STERILE

## (undated) DEVICE — TOWEL PACK, STERILE, 16X24, XRAY DETECTABLE, BLUE, 4/PK

## (undated) DEVICE — SUTURE, VICRYL PLUS 3-0, SH, 27IN

## (undated) DEVICE — COVER, TRANSDUCER, NEO GUARD, 4 X 30CM, LF

## (undated) DEVICE — SUTURE, MONOCRYL, 4-0, 27 IN, PS-2, UNDYED

## (undated) DEVICE — ELECTRODE, ELECTROSURGICAL, BLADE, INSULATED, ENT/IMA, STERILE

## (undated) DEVICE — CAUTERY, PENCIL, PUSH BUTTON, SMOKE EVAC, 70MM

## (undated) DEVICE — PROTECTOR, NERVE, ULNAR, PINK

## (undated) DEVICE — GLOVE, SURGICAL, PROTEXIS PI , 7.5, PF, LF

## (undated) DEVICE — NEEDLE, SAFETY, 25 GA X 1.5 IN

## (undated) DEVICE — ADHESIVE, SKIN, DERMABOND ADVANCED, 15CM, PEN-STYLE

## (undated) DEVICE — APPLIER,  LIGACLIP MULTI CLIP, 30 MED 11 1/2

## (undated) DEVICE — Device

## (undated) DEVICE — MARKER, SKIN, W/ RULER, LF, STERILE

## (undated) DEVICE — SYRINGE, 10 CC, LUER LOCK

## (undated) DEVICE — DRAPE, INCISE, ANTIMICROBIAL, IOBAN 2, LARGE, 17 X 23 IN, DISPOSABLE, STERILE

## (undated) DEVICE — CUP, MEDICINE, GRADUATED, 2 OZ, PLASTIC, DISP, LF

## (undated) DEVICE — STRAP, VELCRO, BODY, 4 X 60IN, NS

## (undated) DEVICE — STRAP, ARM BOARD, 32 X 1.5

## (undated) DEVICE — SUTURE, ETHILON, 2-0, FSLX 30, BLACK

## (undated) DEVICE — SUTURE, VICRYL, 2-0, 27 IN, X-1, UNDYED